# Patient Record
Sex: MALE | Race: BLACK OR AFRICAN AMERICAN | NOT HISPANIC OR LATINO | Employment: PART TIME | URBAN - METROPOLITAN AREA
[De-identification: names, ages, dates, MRNs, and addresses within clinical notes are randomized per-mention and may not be internally consistent; named-entity substitution may affect disease eponyms.]

---

## 2022-05-25 ENCOUNTER — HOSPITAL ENCOUNTER (EMERGENCY)
Facility: HOSPITAL | Age: 42
Discharge: HOME | End: 2022-05-25
Attending: EMERGENCY MEDICINE
Payer: MEDICARE

## 2022-05-25 VITALS
DIASTOLIC BLOOD PRESSURE: 73 MMHG | WEIGHT: 190 LBS | SYSTOLIC BLOOD PRESSURE: 134 MMHG | HEART RATE: 96 BPM | RESPIRATION RATE: 16 BRPM | BODY MASS INDEX: 23.14 KG/M2 | OXYGEN SATURATION: 100 % | HEIGHT: 76 IN | TEMPERATURE: 99 F

## 2022-05-25 DIAGNOSIS — R11.0 NAUSEA: Primary | ICD-10-CM

## 2022-05-25 DIAGNOSIS — R10.9 ABDOMINAL PAIN, UNSPECIFIED ABDOMINAL LOCATION: ICD-10-CM

## 2022-05-25 DIAGNOSIS — E87.6 HYPOKALEMIA: ICD-10-CM

## 2022-05-25 LAB
ALBUMIN SERPL BCP-MCNC: 4.6 G/DL (ref 3.5–5.2)
ALP SERPL-CCNC: 68 U/L (ref 55–135)
ALT SERPL W/O P-5'-P-CCNC: 21 U/L (ref 10–44)
ANION GAP SERPL CALC-SCNC: 10 MMOL/L (ref 8–16)
AST SERPL-CCNC: 22 U/L (ref 10–40)
BACTERIA #/AREA URNS AUTO: ABNORMAL /HPF
BASOPHILS # BLD AUTO: 0.02 K/UL (ref 0–0.2)
BASOPHILS NFR BLD: 0.3 % (ref 0–1.9)
BILIRUB SERPL-MCNC: 0.8 MG/DL (ref 0.1–1)
BILIRUB UR QL STRIP: NEGATIVE
BUN SERPL-MCNC: 17 MG/DL (ref 6–20)
BUN SERPL-MCNC: 20 MG/DL (ref 6–30)
CALCIUM SERPL-MCNC: 10 MG/DL (ref 8.7–10.5)
CHLORIDE SERPL-SCNC: 104 MMOL/L (ref 95–110)
CHLORIDE SERPL-SCNC: 105 MMOL/L (ref 95–110)
CLARITY UR REFRACT.AUTO: CLEAR
CO2 SERPL-SCNC: 24 MMOL/L (ref 23–29)
COLOR UR AUTO: YELLOW
CREAT SERPL-MCNC: 1.5 MG/DL (ref 0.5–1.4)
CREAT SERPL-MCNC: 1.5 MG/DL (ref 0.5–1.4)
DIFFERENTIAL METHOD: ABNORMAL
EOSINOPHIL # BLD AUTO: 0 K/UL (ref 0–0.5)
EOSINOPHIL NFR BLD: 0.4 % (ref 0–8)
ERYTHROCYTE [DISTWIDTH] IN BLOOD BY AUTOMATED COUNT: 12 % (ref 11.5–14.5)
EST. GFR  (AFRICAN AMERICAN): >60 ML/MIN/1.73 M^2
EST. GFR  (NON AFRICAN AMERICAN): 55.8 ML/MIN/1.73 M^2
GLUCOSE SERPL-MCNC: 154 MG/DL (ref 70–110)
GLUCOSE SERPL-MCNC: 159 MG/DL (ref 70–110)
GLUCOSE UR QL STRIP: NEGATIVE
HCT VFR BLD AUTO: 48.1 % (ref 40–54)
HCT VFR BLD CALC: 50 %PCV (ref 36–54)
HGB BLD-MCNC: 17.2 G/DL (ref 14–18)
HGB UR QL STRIP: NEGATIVE
HYALINE CASTS UR QL AUTO: 14 /LPF
IMM GRANULOCYTES # BLD AUTO: 0.02 K/UL (ref 0–0.04)
IMM GRANULOCYTES NFR BLD AUTO: 0.3 % (ref 0–0.5)
KETONES UR QL STRIP: ABNORMAL
LEUKOCYTE ESTERASE UR QL STRIP: NEGATIVE
LIPASE SERPL-CCNC: 17 U/L (ref 4–60)
LYMPHOCYTES # BLD AUTO: 2.2 K/UL (ref 1–4.8)
LYMPHOCYTES NFR BLD: 33 % (ref 18–48)
MCH RBC QN AUTO: 31.3 PG (ref 27–31)
MCHC RBC AUTO-ENTMCNC: 35.8 G/DL (ref 32–36)
MCV RBC AUTO: 88 FL (ref 82–98)
MICROSCOPIC COMMENT: ABNORMAL
MONOCYTES # BLD AUTO: 0.7 K/UL (ref 0.3–1)
MONOCYTES NFR BLD: 9.8 % (ref 4–15)
NEUTROPHILS # BLD AUTO: 3.8 K/UL (ref 1.8–7.7)
NEUTROPHILS NFR BLD: 56.2 % (ref 38–73)
NITRITE UR QL STRIP: NEGATIVE
NRBC BLD-RTO: 0 /100 WBC
PH UR STRIP: 5 [PH] (ref 5–8)
PLATELET # BLD AUTO: 213 K/UL (ref 150–450)
PMV BLD AUTO: 10.5 FL (ref 9.2–12.9)
POC IONIZED CALCIUM: 1.26 MMOL/L (ref 1.06–1.42)
POC TCO2 (MEASURED): 25 MMOL/L (ref 23–29)
POTASSIUM BLD-SCNC: 3.3 MMOL/L (ref 3.5–5.1)
POTASSIUM SERPL-SCNC: 3.3 MMOL/L (ref 3.5–5.1)
PROT SERPL-MCNC: 8.2 G/DL (ref 6–8.4)
PROT UR QL STRIP: ABNORMAL
RBC # BLD AUTO: 5.5 M/UL (ref 4.6–6.2)
RBC #/AREA URNS AUTO: 2 /HPF (ref 0–4)
SAMPLE: ABNORMAL
SODIUM BLD-SCNC: 141 MMOL/L (ref 136–145)
SODIUM SERPL-SCNC: 139 MMOL/L (ref 136–145)
SP GR UR STRIP: >=1.03 (ref 1–1.03)
URN SPEC COLLECT METH UR: ABNORMAL
WBC # BLD AUTO: 6.75 K/UL (ref 3.9–12.7)
WBC #/AREA URNS AUTO: 3 /HPF (ref 0–5)

## 2022-05-25 PROCEDURE — 99284 EMERGENCY DEPT VISIT MOD MDM: CPT | Mod: 25

## 2022-05-25 PROCEDURE — 80047 BASIC METABLC PNL IONIZED CA: CPT

## 2022-05-25 PROCEDURE — 85025 COMPLETE CBC W/AUTO DIFF WBC: CPT | Performed by: EMERGENCY MEDICINE

## 2022-05-25 PROCEDURE — 96374 THER/PROPH/DIAG INJ IV PUSH: CPT

## 2022-05-25 PROCEDURE — 99284 EMERGENCY DEPT VISIT MOD MDM: CPT | Mod: ,,, | Performed by: EMERGENCY MEDICINE

## 2022-05-25 PROCEDURE — 63600175 PHARM REV CODE 636 W HCPCS: Performed by: EMERGENCY MEDICINE

## 2022-05-25 PROCEDURE — 83690 ASSAY OF LIPASE: CPT | Performed by: EMERGENCY MEDICINE

## 2022-05-25 PROCEDURE — 25000003 PHARM REV CODE 250: Performed by: EMERGENCY MEDICINE

## 2022-05-25 PROCEDURE — 81001 URINALYSIS AUTO W/SCOPE: CPT | Performed by: EMERGENCY MEDICINE

## 2022-05-25 PROCEDURE — 80053 COMPREHEN METABOLIC PANEL: CPT | Performed by: EMERGENCY MEDICINE

## 2022-05-25 PROCEDURE — 99284 PR EMERGENCY DEPT VISIT,LEVEL IV: ICD-10-PCS | Mod: ,,, | Performed by: EMERGENCY MEDICINE

## 2022-05-25 RX ORDER — POTASSIUM CHLORIDE 20 MEQ/1
20 TABLET, EXTENDED RELEASE ORAL ONCE
Status: COMPLETED | OUTPATIENT
Start: 2022-05-26 | End: 2022-05-25

## 2022-05-25 RX ORDER — ONDANSETRON 4 MG/1
4 TABLET, FILM COATED ORAL EVERY 6 HOURS PRN
Qty: 12 TABLET | Refills: 0 | Status: SHIPPED | OUTPATIENT
Start: 2022-05-25

## 2022-05-25 RX ORDER — ONDANSETRON 2 MG/ML
4 INJECTION INTRAMUSCULAR; INTRAVENOUS
Status: COMPLETED | OUTPATIENT
Start: 2022-05-25 | End: 2022-05-25

## 2022-05-25 RX ADMIN — POTASSIUM CHLORIDE 20 MEQ: 20 TABLET, EXTENDED RELEASE ORAL at 11:05

## 2022-05-25 RX ADMIN — ONDANSETRON 4 MG: 2 INJECTION INTRAMUSCULAR; INTRAVENOUS at 09:05

## 2022-05-26 ENCOUNTER — HOSPITAL ENCOUNTER (EMERGENCY)
Facility: HOSPITAL | Age: 42
Discharge: HOME | End: 2022-05-27
Attending: EMERGENCY MEDICINE
Payer: MEDICARE

## 2022-05-26 DIAGNOSIS — S62.91XA CLOSED FRACTURE OF RIGHT HAND, INITIAL ENCOUNTER: Primary | ICD-10-CM

## 2022-05-26 PROCEDURE — 99284 EMERGENCY DEPT VISIT MOD MDM: CPT

## 2022-05-26 PROCEDURE — 99284 PR EMERGENCY DEPT VISIT,LEVEL IV: ICD-10-PCS | Mod: ,,, | Performed by: EMERGENCY MEDICINE

## 2022-05-26 PROCEDURE — 63600175 PHARM REV CODE 636 W HCPCS: Performed by: EMERGENCY MEDICINE

## 2022-05-26 PROCEDURE — 96372 THER/PROPH/DIAG INJ SC/IM: CPT | Performed by: EMERGENCY MEDICINE

## 2022-05-26 PROCEDURE — 99284 EMERGENCY DEPT VISIT MOD MDM: CPT | Mod: ,,, | Performed by: EMERGENCY MEDICINE

## 2022-05-26 RX ORDER — KETOROLAC TROMETHAMINE 30 MG/ML
10 INJECTION, SOLUTION INTRAMUSCULAR; INTRAVENOUS
Status: COMPLETED | OUTPATIENT
Start: 2022-05-26 | End: 2022-05-26

## 2022-05-26 RX ADMIN — KETOROLAC TROMETHAMINE 10 MG: 30 INJECTION, SOLUTION INTRAMUSCULAR at 10:05

## 2022-05-26 NOTE — ED NOTES
I-STAT Chem-8+ Results:   Value Reference Range   Sodium 141 136-145 mmol/L   Potassium  3.3 3.5-5.1 mmol/L   Chloride 104  mmol/L   Ionized Calcium 1.26 1.06-1.42 mmol/L   CO2 (measured) 25 23-29 mmol/L   Glucose 159  mg/dL   BUN 20 6-30 mg/dL   Creatinine 1.5 0.5-1.4 mg/dL   Hematocrit 50 36-54%

## 2022-05-26 NOTE — DISCHARGE INSTRUCTIONS
Your labs are reassuring except for slight kidney dysfunction.  Stay hydrated.  Take Zofran as needed for nausea.  Follow-up with primary doctor.

## 2022-05-26 NOTE — ED PROVIDER NOTES
Encounter Date: 5/25/2022    SCRIBE #1 NOTE: I, [place]oke Amado, am scribing for, and in the presence of,  Alicia Umana MD. I have scribed the entire note.   SCRIBE #2 NOTE: I, Makenzie Abel, am scribing for, and in the presence of,  Alicia Umana MD. I have scribed the remaining portions of the note not scribed by Scribe #1.     History     Chief Complaint   Patient presents with    decreased appetite      Pt reports not eating today. Pt denies any pain to mouth. Pt does endorse abdominal pain.      Time patient was seen by the provider: 9:07 PM      The patient is a 42 y.o. male with co-morbidities including: deafness in right ear who presents to the ED with a complaint of decreased appetite with onset of 3 day. He feels nausea, diffuse abdominal pain, subjective fever, and sweats. He denies any vomiting, diarrhea, hematuria, burping, or sick contacts. He stopped drinking alcohol in 2019.     The history is provided by the patient and medical records. No  was used.     Review of patient's allergies indicates:  No Known Allergies  Past Medical History:   Diagnosis Date    Deafness in right ear     uses hearing aid     No past surgical history on file.  No family history on file.  Social History     Tobacco Use    Smoking status: Never Smoker   Substance Use Topics    Alcohol use: Yes    Drug use: No     Review of Systems   Constitutional: Positive for appetite change (decreased ), diaphoresis and fever (subjective ).   HENT: Negative for sore throat.    Respiratory: Negative for shortness of breath.    Cardiovascular: Negative for chest pain.   Gastrointestinal: Positive for abdominal pain and nausea. Negative for diarrhea and vomiting.   Genitourinary: Negative for dysuria and hematuria.   Musculoskeletal: Negative for back pain.   Skin: Negative for rash.   Neurological: Negative for weakness.   Hematological: Does not bruise/bleed easily.       Physical Exam     Initial Vitals [05/25/22  2051]   BP Pulse Resp Temp SpO2   134/73 96 14 98.6 °F (37 °C) 97 %      MAP       --         Physical Exam    Nursing note and vitals reviewed.  Constitutional: He appears well-developed and well-nourished. He is not diaphoretic. No distress.   HENT:   Head: Normocephalic and atraumatic.   Nose: Nose normal.   Eyes: Conjunctivae and EOM are normal. Pupils are equal, round, and reactive to light. No scleral icterus.   Neck: Neck supple.   Normal range of motion.  Cardiovascular: Normal rate and regular rhythm. Exam reveals no gallop and no friction rub.    No murmur heard.  Pulmonary/Chest: Breath sounds normal. No respiratory distress. He has no wheezes. He has no rhonchi. He has no rales.   Abdominal: Abdomen is soft. Bowel sounds are normal. There is abdominal tenderness (periumbilical and left mid). There is no rebound and no guarding.   Musculoskeletal:         General: No tenderness or edema (peripheral). Normal range of motion.      Cervical back: Normal range of motion and neck supple.     Neurological: He is alert and oriented to person, place, and time. He has normal strength. No sensory deficit. GCS score is 15. GCS eye subscore is 4. GCS verbal subscore is 5. GCS motor subscore is 6.   Skin: Skin is warm and dry. No rash noted. No erythema. No pallor.   Psychiatric: He has a normal mood and affect. His behavior is normal. Judgment and thought content normal.         ED Course   Procedures  Labs Reviewed   CBC W/ AUTO DIFFERENTIAL - Abnormal; Notable for the following components:       Result Value    MCH 31.3 (*)     All other components within normal limits   COMPREHENSIVE METABOLIC PANEL - Abnormal; Notable for the following components:    Potassium 3.3 (*)     Glucose 154 (*)     Creatinine 1.5 (*)     eGFR if non  55.8 (*)     All other components within normal limits   URINALYSIS, REFLEX TO URINE CULTURE - Abnormal; Notable for the following components:    Specific Gravity, UA  >=1.030 (*)     Protein, UA 2+ (*)     Ketones, UA Trace (*)     All other components within normal limits    Narrative:     Specimen Source->Urine   URINALYSIS MICROSCOPIC - Abnormal; Notable for the following components:    Hyaline Casts, UA 14 (*)     All other components within normal limits    Narrative:     Specimen Source->Urine   ISTAT PROCEDURE - Abnormal; Notable for the following components:    POC Glucose 159 (*)     POC Creatinine 1.5 (*)     POC Potassium 3.3 (*)     All other components within normal limits   LIPASE          Imaging Results    None          Medications   ondansetron injection 4 mg (4 mg Intravenous Given 5/25/22 2123)   potassium chloride SA CR tablet 20 mEq (20 mEq Oral Given 5/25/22 2332)     Medical Decision Making:   History:   Old Medical Records: I decided to obtain old medical records.  Initial Assessment:   Emergent evaluation of a 42 year old male presents to the ED with nausea and abdominal pain for several days. His vitals are stable and he is well appearing in no distress with mild tenderness on exam with no rebound or guarding.  Differential Diagnosis:   Gastritis, pancreatitis, constipation, gastroenteritis, low suspicion for appendicitis, hernia, or colitis.   Clinical Tests:   Lab Tests: Ordered and Reviewed  ED Management:  - labs  - zofran IV            Scribe Attestation:   Scribe #1: I performed the above scribed service and the documentation accurately describes the services I performed. I attest to the accuracy of the note.  Scribe #2: I performed the above scribed service and the documentation accurately describes the services I performed. I attest to the accuracy of the note.        ED Course as of 05/26/22 1650   Wed May 25, 2022   2200 POC BUN: 20 [GM]   2200 POC Creatinine(!): 1.5 [GM]   2200 POC Potassium(!): 3.3 [GM]   2216 WBC: 6.75 [GM]   2329 Ketones, UA(!): Trace [GM]   2329 Lipase: 17 [GM]   2329 Creatinine today 1.5 up from 1.15 years ago.  He does  have mild hypokalemia at 3.3.  Will replace.  Lipase normal at 17. UA did have trace ketones, 2+ protein but rare bacteria, 3 wbc's.    I have recommended Zofran as needed for nausea.  Return to ED for worsening symptoms.  Meanwhile, increase fluids follow-up with PCP. [GM]      ED Course User Index  [GM] Alicia Umana MD             Clinical Impression:   Final diagnoses:  [R11.0] Nausea (Primary)  [R10.9] Abdominal pain, unspecified abdominal location  [E87.6] Hypokalemia          ED Disposition Condition    Discharge Stable        ED Prescriptions     Medication Sig Dispense Start Date End Date Auth. Provider    ondansetron (ZOFRAN) 4 MG tablet Take 1 tablet (4 mg total) by mouth every 6 (six) hours as needed for Nausea. 12 tablet 5/25/2022  Alicia Umana MD        Follow-up Information     Follow up With Specialties Details Why Contact Info    [facility] - Emergency Dept Emergency Medicine  If symptoms worsen [id number]  [facility] [id number] [phone]           Alicia Umana MD  05/26/22 6434

## 2022-05-27 VITALS
WEIGHT: 190 LBS | OXYGEN SATURATION: 100 % | BODY MASS INDEX: 25.73 KG/M2 | HEIGHT: 72 IN | HEART RATE: 79 BPM | DIASTOLIC BLOOD PRESSURE: 88 MMHG | TEMPERATURE: 98 F | SYSTOLIC BLOOD PRESSURE: 144 MMHG | RESPIRATION RATE: 17 BRPM

## 2022-05-27 PROCEDURE — 29125 APPL SHORT ARM SPLINT STATIC: CPT | Mod: RT

## 2022-05-27 RX ORDER — HYDROCODONE BITARTRATE AND ACETAMINOPHEN 5; 325 MG/1; MG/1
1 TABLET ORAL EVERY 4 HOURS PRN
Qty: 12 TABLET | Refills: 0 | Status: SHIPPED | OUTPATIENT
Start: 2022-05-27

## 2022-05-27 NOTE — DISCHARGE INSTRUCTIONS
If you would like to follow up with the [facility] for further care of your fracture, please call the [facility] Scheduling Department at [phone] during business hours. Please let the  know you need a fracture follow-up appointment with Orthopedics, and you will be scheduled in the Orthopedic Clinic. Please bring your original Emergency Department discharge papers with you to the clinic appointment.

## 2022-05-27 NOTE — ED NOTES
Patient identifiers verified and correct for Olive Pascal  C/C: Right hand injury, pain and swelling   APPEARANCE: awake and alert in NAD.  SKIN: warm, dry and intact. No breakdown or bruising.  MUSCULOSKELETAL: Patient moving all extremities spontaneously, no obvious swelling or deformities noted. Ambulates independently.  RESPIRATORY: Denies shortness of breath.Respirations unlabored.   CARDIAC: Denies CP, 2+ distal pulses; no peripheral edema  ABDOMEN: S/ND/NT, Denies nausea  : voids spontaneously, denies difficulty  Neurologic: AAO x 4; follows commands equal strength in all extremities; denies numbness/tingling. Denies dizziness

## 2022-05-27 NOTE — CONSULTS
Orthopedic Surgery  Consult Note    Olive Pascla  05/27/2022    CC: Right hand injury    HPI: Olive Pascal is a 42 y.o. male with PMHx significant for right ear deafness and chronic hep C who presents with right hand injury after punching a car. Denies prior injuries or surgeries to the right hand. Patient is RHD at baseline. Denies other MSK pains or paresthesias in the right hand. He denies any focal motor deficits or any open injuries.       Past Medical History:   Diagnosis Date    Deafness in right ear     uses hearing aid     No past surgical history on file.  No family history on file.  Social History     Socioeconomic History    Marital status: Single   Tobacco Use    Smoking status: Never Smoker   Substance and Sexual Activity    Alcohol use: Yes    Drug use: No    Sexual activity: Yes     Partners: Male, Female     Birth control/protection: None     No current facility-administered medications on file prior to encounter.     Current Outpatient Medications on File Prior to Encounter   Medication Sig    hyoscyamine (LEVSIN/SL) 0.125 mg Subl Place 1 tablet (0.125 mg total) under the tongue every 4 (four) hours as needed.    naproxen (NAPROSYN) 500 MG tablet Take 1 tablet (500 mg total) by mouth 2 (two) times daily with meals.    ondansetron (ZOFRAN) 4 MG tablet Take 1 tablet (4 mg total) by mouth every 6 (six) hours as needed for Nausea.         Review of Systems:  Constitutional: negative for fevers  Eyes: negative visual changes  ENT: negative for hearing loss  Respiratory: negative for dyspnea  Cardiovascular: negative for chest pain  Gastrointestinal: negative for abdominal pain  Genitourinary: negative for dysuria  Neurological: negative for headaches  Behavioral/Psych: negative for hallucinations  Endocrine: negative for temperature intolerance      Physical Exam:    Temp:  [98.1 °F (36.7 °C)-99.2 °F (37.3 °C)] 98.1 °F (36.7 °C)  Pulse:  [74-84] 79  Resp:  [16-17] 17  SpO2:  [98 %-100 %] 100 %  BP:  (143-152)/() 144/88    Vitals: Afebrile.  Vital signs stable.  General: No acute distress.  HEENT: Normocephalic. Atraumatic. Sclera anicteric. No tracheal deviation.  Cardio: Regular rate.  Chest: No increased work of breathing.  Abdominal: Nondistended.  Extremities: No cyanosis.  No clubbing.    Skin: No generalized rash.  Neuro: Awake. Alert. Oriented to person, place, time, and situation.  Psych: Normal appearance. Cooperative.  Appropriate mood.  Appropriate affect.      MSK:  Right hand exam:    No discoloration  No scars  No wounds  Moderate swelling over dorsum of hand  No masses  Significantly TTP over dorsum of hand at base of 4th/5th metacarpals  No wrist effusion  Warm, well perfused    Fingers flex to distal palmar crease  Thumb opposes to base of small finger  No pain with active wrist flexion/extension    SILT and motor intact M/R/U  Radial pulse palpable        Diagnostic Results: XR of the left hand show a displaced 4th metacarpal base fx and nondisplaced base of 5th metacarpal fx. There is scapholunate widening and dorsal soft tissue swelling.     Assessment/Plan:  Olive Pascal is a 42 y.o. male with right hand fourth and fifth metacarpal base fractures and scapholunate ligament injury.   - Splinted in ED in ulnar gutter splint  - NWB to right hand, pt encouraged to keep extremity iced and elevated at all times  - Patient educated on the signs and symptoms of compartment syndrome and instructed to return to the hospital immediately if these symptoms arise  - Follow up with hand surgery for discussion of further management   - I offered the [place] [facility] but he has care already established with [facility] and wishes to follow up there. All imaging performed today was provided on a disc. I stressed the importance of following up with hand surgery for discussion of possible operative intervention.       Elkin Paniagua MD  Orthopedic Surgery Resident  05/27/2022

## 2022-05-27 NOTE — ED PROVIDER NOTES
Encounter Date: 5/26/2022    SCRIBE #1 NOTE: I, Kenton Amado, am scribing for, and in the presence of,  Alicia Umana MD. I have scribed the following portions of the note - Other sections scribed: HPI ROS PE.   SCRIBE #2 NOTE: I, Gloria Jordon, am scribing for, and in the presence of,  Alicia Umana MD. I have scribed the remaining portions of the note not scribed by Scribe #1.     History     Chief Complaint   Patient presents with    Hand Pain     Right hand pain and swelling after punching a car     Time patient was seen by the provider: 9:47 PM      The patient is a 42 y.o. male with co-morbidities including: deafness in the right ear, who presents to the ED with a complaint of right hand pain onset tonight. The patient's mother is present today and states that he punched a car with his right hand. He now has pain to his wrist and hand and swelling to his 4th and 5th fingers. He took tylenol PTA. Denies elbow pain, chest pain, headache.     The history is provided by the patient, a parent and medical records. No  was used.     Review of patient's allergies indicates:  No Known Allergies  Past Medical History:   Diagnosis Date    Deafness in right ear     uses hearing aid     No past surgical history on file.  No family history on file.  Social History     Tobacco Use    Smoking status: Never Smoker   Substance Use Topics    Alcohol use: Yes    Drug use: No     Review of Systems   Constitutional: Negative for fever.   HENT: Negative for sore throat.    Respiratory: Negative for shortness of breath.    Cardiovascular: Negative for chest pain.   Gastrointestinal: Negative for nausea.   Genitourinary: Negative for dysuria.   Musculoskeletal: Positive for arthralgias (right wrist) and myalgias (right hand). Negative for back pain.        +Right hand swelling   Skin: Negative for rash.   Neurological: Negative for headaches.   Hematological: Does not bruise/bleed easily.       Physical  Exam     Initial Vitals [05/26/22 2047]   BP Pulse Resp Temp SpO2   (!) 152/108 84 16 99.2 °F (37.3 °C) 98 %      MAP       --         Physical Exam    Nursing note and vitals reviewed.  Constitutional: He appears well-developed and well-nourished. He is not diaphoretic. No distress.   HENT:   Head: Normocephalic and atraumatic.   Neck: Neck supple. No JVD present.   Normal range of motion.  Cardiovascular: Normal rate, regular rhythm, normal heart sounds and intact distal pulses.   Pulmonary/Chest: Breath sounds normal. No respiratory distress. He has no wheezes. He has no rhonchi. He has no rales.   Abdominal: Abdomen is soft. He exhibits no distension. There is no abdominal tenderness.   Musculoskeletal:         General: No tenderness or edema. Normal range of motion.      Right wrist: No swelling.      Cervical back: Normal range of motion and neck supple.      Comments: Significant swelling and tenderness over the 4th and 5th metacarpal. Full range of motion of the fingers. No significant swelling to the wrist.      Neurological: He is alert and oriented to person, place, and time. He has normal strength. No cranial nerve deficit or sensory deficit.   Skin: Skin is warm and dry.         ED Course   Procedures  Labs Reviewed - No data to display       Imaging Results          X-Ray Hand 3 view Right (Final result)  Result time 05/26/22 22:58:39    Final result by Isidro Garcia MD (05/26/22 22:58:39)                 Impression:      Acute fractures involving the 4th and 5th metacarpals with additional questionable fracture of the hamate.    Small bone fragment adjacent to the radial styloid which could represent a bone spur versus accessory ossicle.  Avulsion fracture felt less likely in this patient with punching injury though correlation is advised.    Additional findings discussed in the body of the report.      Electronically signed by: Isidro Garcia MD  Date:    05/26/2022  Time:    22:58              Narrative:    EXAMINATION:  XR HAND COMPLETE 3 VIEW RIGHT    CLINICAL HISTORY:  right hand pain and swelling;    TECHNIQUE:  Three views of the right hand.    COMPARISON:  None.    FINDINGS:  Displaced acute fracture involving the base of the 4th metacarpal with approximately 4-5 mm fracture displacement.  Minimal nondisplaced acute fracture involving the base of the 5th metacarpal at the carpometacarpal articulation.  Small fracture fragment noted inferior to the 5th metacarpal with donor site potentially the hamate or 5th metacarpal.  Small spur or avulsion injury involving the radial styloid.  Relative dorsal positioning of the distal ulna with respect to the distal radius could be exaggerated by positioning though suggest correlation for any clinical signs of distal radioulnar joint injury.  There is soft tissue edema along the dorsal aspect of the hand.  No unexpected radiopaque foreign body.                                 Medications   ketorolac injection 9.999 mg (9.999 mg Intramuscular Given 5/26/22 2207)     Medical Decision Making:   History:   Old Medical Records: I decided to obtain old medical records.  Initial Assessment:   Emergent evaluation a 42-year-old male presenting with right hand pain and swelling after punching a car.    Hypertensive otherwise vital signs are stable  Differential Diagnosis:   Fracture, contusion, sprain, strain  Independently Interpreted Test(s):   I have ordered and independently interpreted X-rays - see prior notes.  Clinical Tests:   Radiological Study: Ordered and Reviewed  ED Management:  - x-ray  - Toradol IM          Scribe Attestation:   Scribe #1: I performed the above scribed service and the documentation accurately describes the services I performed. I attest to the accuracy of the note.  Scribe #2: I performed the above scribed service and the documentation accurately describes the services I performed. I attest to the accuracy of the note.        ED Course as of  05/27/22 0037   Thu May 26, 2022   2255 X-ray reviewed, concerning for displaced, 4th metacarpal fracture.    Will likely need to be reduced, orthopedics consulted. [GM]   2323 Discussed with Ortho, will evaluate patient [GM]   Fri May 27, 2022   0030 Patient evaluate Ortho at bedside, placed in splint.    Repeat x-rays performed.    Patient given ambulatory referral to follow up at [facility].  Patient provided with CD w/ his imaging.  Norco for pain control.  Sling for comfort.  Return precautions given   [GM]      ED Course User Index  [GM] Alicia Umana MD             Clinical Impression:   Final diagnoses:  [S62.91XA] Closed fracture of right hand, initial encounter (Primary)          ED Disposition Condition    Discharge Stable        ED Prescriptions     None        Follow-up Information     Follow up With Specialties Details Why Contact [facility] Surgical Oncology, Orthopedic Surgery, Genetics, Physical Medicine and Rehabilitation, Occupational Therapy, Radiology Schedule an appointment as soon as possible for a visit   [address] [place] [id number]  [phone]             Alicia Umana MD  05/27/22 0037

## 2023-01-09 ENCOUNTER — HOSPITAL ENCOUNTER (EMERGENCY)
Facility: HOSPITAL | Age: 43
Discharge: HOME | End: 2023-01-09
Attending: EMERGENCY MEDICINE
Payer: MEDICARE

## 2023-01-09 VITALS
RESPIRATION RATE: 16 BRPM | OXYGEN SATURATION: 98 % | WEIGHT: 190 LBS | DIASTOLIC BLOOD PRESSURE: 90 MMHG | TEMPERATURE: 99 F | SYSTOLIC BLOOD PRESSURE: 158 MMHG | HEART RATE: 67 BPM | HEIGHT: 72 IN | BODY MASS INDEX: 25.73 KG/M2

## 2023-01-09 DIAGNOSIS — K76.9 LIVER LESION: ICD-10-CM

## 2023-01-09 DIAGNOSIS — N12 PYELONEPHRITIS: Primary | ICD-10-CM

## 2023-01-09 LAB
ALBUMIN SERPL BCP-MCNC: 4.4 G/DL (ref 3.5–5.2)
ALP SERPL-CCNC: 63 U/L (ref 55–135)
ALT SERPL W/O P-5'-P-CCNC: 24 U/L (ref 10–44)
ANION GAP SERPL CALC-SCNC: 7 MMOL/L (ref 8–16)
AST SERPL-CCNC: 15 U/L (ref 10–40)
BACTERIA #/AREA URNS AUTO: ABNORMAL /HPF
BASOPHILS # BLD AUTO: 0.02 K/UL (ref 0–0.2)
BASOPHILS NFR BLD: 0.3 % (ref 0–1.9)
BILIRUB SERPL-MCNC: 0.6 MG/DL (ref 0.1–1)
BILIRUB UR QL STRIP: NEGATIVE
BUN SERPL-MCNC: 8 MG/DL (ref 6–20)
BUN SERPL-MCNC: 8 MG/DL (ref 6–30)
CALCIUM SERPL-MCNC: 10.2 MG/DL (ref 8.7–10.5)
CHLORIDE SERPL-SCNC: 105 MMOL/L (ref 95–110)
CHLORIDE SERPL-SCNC: 106 MMOL/L (ref 95–110)
CLARITY UR REFRACT.AUTO: CLEAR
CO2 SERPL-SCNC: 23 MMOL/L (ref 23–29)
COLOR UR AUTO: YELLOW
CREAT SERPL-MCNC: 0.9 MG/DL (ref 0.5–1.4)
CREAT SERPL-MCNC: 1.2 MG/DL (ref 0.5–1.4)
DIFFERENTIAL METHOD: NORMAL
EOSINOPHIL # BLD AUTO: 0.1 K/UL (ref 0–0.5)
EOSINOPHIL NFR BLD: 1.5 % (ref 0–8)
ERYTHROCYTE [DISTWIDTH] IN BLOOD BY AUTOMATED COUNT: 12.1 % (ref 11.5–14.5)
EST. GFR  (NO RACE VARIABLE): >60 ML/MIN/1.73 M^2
GLUCOSE SERPL-MCNC: 103 MG/DL (ref 70–110)
GLUCOSE SERPL-MCNC: 83 MG/DL (ref 70–110)
GLUCOSE UR QL STRIP: NEGATIVE
HCT VFR BLD AUTO: 48.7 % (ref 40–54)
HCT VFR BLD CALC: 48 %PCV (ref 36–54)
HCV AB SERPL QL IA: REACTIVE
HGB BLD-MCNC: 17 G/DL (ref 14–18)
HGB UR QL STRIP: NEGATIVE
HIV 1+2 AB+HIV1 P24 AG SERPL QL IA: NORMAL
IMM GRANULOCYTES # BLD AUTO: 0.01 K/UL (ref 0–0.04)
IMM GRANULOCYTES NFR BLD AUTO: 0.2 % (ref 0–0.5)
KETONES UR QL STRIP: NEGATIVE
LEUKOCYTE ESTERASE UR QL STRIP: ABNORMAL
LIPASE SERPL-CCNC: 15 U/L (ref 4–60)
LYMPHOCYTES # BLD AUTO: 1.9 K/UL (ref 1–4.8)
LYMPHOCYTES NFR BLD: 32 % (ref 18–48)
MCH RBC QN AUTO: 31 PG (ref 27–31)
MCHC RBC AUTO-ENTMCNC: 34.9 G/DL (ref 32–36)
MCV RBC AUTO: 89 FL (ref 82–98)
MICROSCOPIC COMMENT: ABNORMAL
MONOCYTES # BLD AUTO: 0.6 K/UL (ref 0.3–1)
MONOCYTES NFR BLD: 9.6 % (ref 4–15)
NEUTROPHILS # BLD AUTO: 3.4 K/UL (ref 1.8–7.7)
NEUTROPHILS NFR BLD: 56.4 % (ref 38–73)
NITRITE UR QL STRIP: NEGATIVE
NRBC BLD-RTO: 0 /100 WBC
PH UR STRIP: 6 [PH] (ref 5–8)
PLATELET # BLD AUTO: 177 K/UL (ref 150–450)
PMV BLD AUTO: 10.3 FL (ref 9.2–12.9)
POC IONIZED CALCIUM: 1.17 MMOL/L (ref 1.06–1.42)
POC TCO2 (MEASURED): 28 MMOL/L (ref 23–29)
POTASSIUM BLD-SCNC: 4.6 MMOL/L (ref 3.5–5.1)
POTASSIUM SERPL-SCNC: 3.5 MMOL/L (ref 3.5–5.1)
PROT SERPL-MCNC: 7.9 G/DL (ref 6–8.4)
PROT UR QL STRIP: NEGATIVE
RBC # BLD AUTO: 5.49 M/UL (ref 4.6–6.2)
RBC #/AREA URNS AUTO: 2 /HPF (ref 0–4)
SAMPLE: NORMAL
SODIUM BLD-SCNC: 141 MMOL/L (ref 136–145)
SODIUM SERPL-SCNC: 136 MMOL/L (ref 136–145)
SP GR UR STRIP: 1.02 (ref 1–1.03)
SQUAMOUS #/AREA URNS AUTO: 0 /HPF
URN SPEC COLLECT METH UR: ABNORMAL
WBC # BLD AUTO: 6.07 K/UL (ref 3.9–12.7)
WBC #/AREA URNS AUTO: 20 /HPF (ref 0–5)

## 2023-01-09 PROCEDURE — 87522 HEPATITIS C REVRS TRNSCRPJ: CPT | Mod: 91 | Performed by: PHYSICIAN ASSISTANT

## 2023-01-09 PROCEDURE — 86803 HEPATITIS C AB TEST: CPT | Performed by: PHYSICIAN ASSISTANT

## 2023-01-09 PROCEDURE — 99285 EMERGENCY DEPT VISIT HI MDM: CPT | Mod: 25

## 2023-01-09 PROCEDURE — 96365 THER/PROPH/DIAG IV INF INIT: CPT | Mod: 59

## 2023-01-09 PROCEDURE — 83690 ASSAY OF LIPASE: CPT | Performed by: PHYSICIAN ASSISTANT

## 2023-01-09 PROCEDURE — 99284 EMERGENCY DEPT VISIT MOD MDM: CPT | Mod: ,,, | Performed by: PHYSICIAN ASSISTANT

## 2023-01-09 PROCEDURE — 99284 PR EMERGENCY DEPT VISIT,LEVEL IV: ICD-10-PCS | Mod: ,,, | Performed by: PHYSICIAN ASSISTANT

## 2023-01-09 PROCEDURE — 87389 HIV-1 AG W/HIV-1&-2 AB AG IA: CPT | Performed by: PHYSICIAN ASSISTANT

## 2023-01-09 PROCEDURE — 80047 BASIC METABLC PNL IONIZED CA: CPT | Mod: 59

## 2023-01-09 PROCEDURE — 81001 URINALYSIS AUTO W/SCOPE: CPT | Performed by: PHYSICIAN ASSISTANT

## 2023-01-09 PROCEDURE — 96375 TX/PRO/DX INJ NEW DRUG ADDON: CPT

## 2023-01-09 PROCEDURE — 96361 HYDRATE IV INFUSION ADD-ON: CPT

## 2023-01-09 PROCEDURE — 80053 COMPREHEN METABOLIC PANEL: CPT | Performed by: PHYSICIAN ASSISTANT

## 2023-01-09 PROCEDURE — 25500020 PHARM REV CODE 255: Performed by: EMERGENCY MEDICINE

## 2023-01-09 PROCEDURE — 85025 COMPLETE CBC W/AUTO DIFF WBC: CPT | Performed by: PHYSICIAN ASSISTANT

## 2023-01-09 PROCEDURE — 25000003 PHARM REV CODE 250: Performed by: PHYSICIAN ASSISTANT

## 2023-01-09 PROCEDURE — 87522 HEPATITIS C REVRS TRNSCRPJ: CPT | Performed by: PHYSICIAN ASSISTANT

## 2023-01-09 PROCEDURE — 63600175 PHARM REV CODE 636 W HCPCS: Performed by: PHYSICIAN ASSISTANT

## 2023-01-09 PROCEDURE — 87086 URINE CULTURE/COLONY COUNT: CPT | Performed by: PHYSICIAN ASSISTANT

## 2023-01-09 RX ORDER — KETOROLAC TROMETHAMINE 30 MG/ML
10 INJECTION, SOLUTION INTRAMUSCULAR; INTRAVENOUS
Status: COMPLETED | OUTPATIENT
Start: 2023-01-09 | End: 2023-01-09

## 2023-01-09 RX ORDER — CEFDINIR 300 MG/1
300 CAPSULE ORAL 2 TIMES DAILY
Qty: 20 CAPSULE | Refills: 0 | Status: SHIPPED | OUTPATIENT
Start: 2023-01-09 | End: 2023-01-19

## 2023-01-09 RX ADMIN — CEFTRIAXONE 1 G: 1 INJECTION, POWDER, FOR SOLUTION INTRAMUSCULAR; INTRAVENOUS at 04:01

## 2023-01-09 RX ADMIN — IOHEXOL 100 ML: 350 INJECTION, SOLUTION INTRAVENOUS at 03:01

## 2023-01-09 RX ADMIN — SODIUM CHLORIDE 1000 ML: 9 INJECTION, SOLUTION INTRAVENOUS at 12:01

## 2023-01-09 RX ADMIN — KETOROLAC TROMETHAMINE 10 MG: 30 INJECTION, SOLUTION INTRAMUSCULAR; INTRAVENOUS at 12:01

## 2023-01-09 NOTE — ED NOTES
I-STAT Chem-8+ Results:   Value Reference Range   Sodium 141 136-145 mmol/L   Potassium  4.6 3.5-5.1 mmol/L   Chloride 105  mmol/L   Ionized Calcium 1.17 1.06-1.42 mmol/L   CO2 (measured) 28 23-29 mmol/L   Glucose 83  mg/dL   BUN 8 6-30 mg/dL   Creatinine 0.9 0.5-1.4 mg/dL   Hematocrit 48 36-54%

## 2023-01-09 NOTE — DISCHARGE INSTRUCTIONS
You have a kidney infection.  You were given dose of antibiotics in the ER. Continue vantin every 12 days for the next 10 days to complete treatment.   You can take acetaminophen/tylenol 650 mg every 6 hours or 1000 mg every 8 hours for added relief.  Apply ice to the area for 10-20 minutes every 4 hours. You can apply heat 2 days after for the same duration and frequency.  Follow up with PCP.  Return to the ER for new or worsening symptoms.  Imaging Results              CT Abdomen Pelvis With Contrast (Final result)  Result time 01/09/23 16:09:09      Final result by Manjit Adams MD (01/09/23 16:09:09)                   Impression:      Apparent hyperemia of the distal stomach/proximal duodenum.  Recommend clinical correlation with history of gastritis/duodenitis.    No evidence of appendicitis, cholecystitis, or diverticulitis.    Indeterminate 1.7 cm hypoattenuating lesion within the right hepatic lobe, which demonstrates asymmetric peripheral enhancement, and possibly represents hemangioma.  Recommend outpatient follow-up with CT triple phase or MRI, if clinically warranted.    Few additional hypoattenuating liver lesions, which are too small to characterize.    Stable right-sided septated renal cysts.    Ptotic, malrotated left kidney.    Electronically signed by resident: Asuncion Alejanrdo  Date:    01/09/2023  Time:    15:18    Electronically signed by: Manjit Adams MD  Date:    01/09/2023  Time:    16:09               Narrative:    EXAMINATION:  CT ABDOMEN PELVIS WITH CONTRAST    CLINICAL HISTORY:  Abdominal pain, acute, nonlocalized; generalized abdominal pain, right flank pain, bilateral low back pain that began 1 week ago.    TECHNIQUE:  Axial images of the chest, abdomen, and pelvis were acquired after the use of 100 cc [id number] IV contrast.  Coronal and sagittal reconstructions were also obtained    COMPARISON:  Abdominal ultrasound 04/26/2016    FINDINGS:  Thoracic soft tissues: Partially visualized thoracic  soft tissues appear unremarkable.    Heart: No pericardial effusion.    Lungs: Lung bases are clear.  No pleural effusion.    Esophagus: Unremarkable.    Stomach and duodenum: Possible mild hyperemia of the distal gastric and proximal duodenal mucosa.    Liver: Normal in size and contour.  Within the right hepatic lobe there is a hypoattenuating lesion with some peripheral nodular enhancement, which measures 1.7 x 1.1 cm (series 2, image 42).  Few additional small hypoattenuating liver lesions, which are too small to characterize (for example series 2, image 20 and series 2, image 27).    Gallbladder: No calcified gallstones.    Bile Ducts: No evidence of dilated ducts.    Spleen: Unremarkable.    Pancreas: No mass or peripancreatic fat stranding.    Adrenals: Unremarkable.    Kidneys/Ureters: The right kidney is normal in size and location.  There is a septated right renal cyst measuring up to 1.5 cm in greatest length, similar to ultrasound dated 04/26/2016..  The left kidney is ptotic and somewhat malrotated, as detailed in prior ultrasound report.  As best appreciated on the sagittal view, the left renal artery arises from the anterior aspect of the abdominal aorta at the L3-4 level.  Symmetric bilateral renal enhancement.  No hydronephrosis or nephrolithiasis. No ureteral dilatation.    Bladder: No evidence of wall thickening.    Reproductive organs: Unremarkable.    Bowel/Mesentery: Small bowel is normal in caliber with no evidence of obstruction.  No evidence of inflammation or wall thickening.  Colon demonstrates no focal wall thickening.  Appendix is visualized and appears within normal limits.    Peritoneum: No intraperitoneal free air or fluid.    Lymph nodes: No lymphadenopathy.    Abdominal wall:  Unremarkable.    Vasculature: No aneurysm. No significant calcific atherosclerosis.    Bones: Few sclerotic foci in the pelvic bones, likely bone islands.  No acute fracture. No suspicious osseous lesions.

## 2023-01-09 NOTE — PROVIDER PROGRESS NOTES - EMERGENCY DEPT.
Encounter Date: 1/9/2023    ED Physician Progress Notes        Physician Note:   ED Physician Hand-off Note:    ED Course: I assumed care of patient from off-going ED primary team. Briefly, patient is a 42-year-old male with a history of hepatitis-C, successfully treated per patient, deaf, that presents to [facility] ED with right flank pain and back pain for 1 week.  CBC with no leukocytosis or anemia.  CMP without electrolyte abnormalities, kidney injury, or signs of liver failure.  No signs of pancreatitis.  UA with microscopic hematuria and rare bacteria.  Patient given ceftriaxone 1 g IV.    At the time of sign-out, workup was pending CT scan and clinical improvement.    Medications given in the ED:    Medications  sodium chloride 0.9% bolus 1,000 mL 1,000 mL (0 mLs Intravenous Stopped 1/9/23 1324)  ketorolac injection 9.999 mg (9.999 mg Intravenous Given 1/9/23 1229)  iohexoL (OMNIPAQUE 350) injection 100 mL (100 mLs Intravenous Given 1/9/23 1512)  cefTRIAXone (ROCEPHIN) 1 g in dextrose 5 % in water (D5W) 5 % 50 mL IVPB (MB+) (1 g Intravenous New Bag 1/9/23 1603)    ED course:  4:54 PM  [employer] interpretation services used.   Patient received ceftriaxone IV for UTI.  His CT scan shows possible gastritis/duodenitis.  No appendicitis, cholecystitis, diverticulitis, nephrolithiasis, or hydronephrosis.  He has liver lesions.  Patient updated with results.  We will continue treatment for pyelonephritis given flank pain with Vantin.  Discussed signs of gastritis.  Recommend OTC Pepcid.  I also notified him of liver lesions for which he should follow-up with PCP.  Was provided with printout of his CT scan.  All of his questions were answered.  Patient comfortable with plan and stable for discharge.    Disposition: discharge    Impression:   Pyelonephritis  (primary encounter diagnosis)  Liver lesion        4:55 PM  Lamar Canseco PA-C  Emergent Department  [facility]  Spectralink #[id number] or #[phone]

## 2023-01-09 NOTE — Clinical Note
"Olive Pascal (Jabbaar) was seen and treated in our emergency department on 1/9/2023.  He may return to work on 01/10/2023.       If you have any questions or concerns, please don't hesitate to call.      Lamar Canseco PA-C"

## 2023-01-09 NOTE — ED TRIAGE NOTES
"Olive Pascal, a 42 y.o. male presents to the ED w/ complaint of back pain, abdominal pain and side pain "allover. Pt reports the back pain as a grabbing sensation, cad cramping pain in abdomen. Pt reports nothing makes the pain better or worse- constant pain.  Pt reports last bowel movement was a week ago and it was black stool.    Triage note:  Chief Complaint   Patient presents with    Multiple complaints     Lower back pain and lower abd pain for week,     Review of patient's allergies indicates:  No Known Allergies  Past Medical History:   Diagnosis Date    Deafness in right ear     uses hearing aid       "

## 2023-01-09 NOTE — ED PROVIDER NOTES
Encounter Date: 1/9/2023       History     Chief Complaint   Patient presents with    Multiple complaints     Lower back pain and lower abd pain for week,     42-year-old male with past medical history of deafness in the right ear presents to the ED with chief complaint of generalized abdominal pain, right flank pain, bilateral low back pain that began 1 week ago.  His symptoms have been constant since onset.  He endorses similar symptoms in the past.  Thinks it was related to his hepatitis-C.  He states that he was fully treated several years ago.  He had 1 episode of dark brown stool a week ago.  He has  had two bowel movements since then, which were normal in color.  His last bowel movement was yesterday and was normal for him. He denies fevers, chills, chest pain, shortness of breath, cough, congestion, dysuria, hematuria.  He had a positive home COVID test 4 days ago.  His father and mother also test positive. He has not taken any medications for his symptoms. He denies other worsening or alleviating factors.     Review of patient's allergies indicates:  No Known Allergies  Past Medical History:   Diagnosis Date    Deafness in right ear     uses hearing aid     History reviewed. No pertinent surgical history.  History reviewed. No pertinent family history.  Social History     Tobacco Use    Smoking status: Never   Substance Use Topics    Alcohol use: Yes    Drug use: No     Review of Systems   Constitutional:  Negative for fever.   HENT:  Negative for sore throat.    Respiratory:  Negative for shortness of breath.    Cardiovascular:  Negative for chest pain.   Gastrointestinal:  Positive for abdominal pain and constipation. Negative for nausea.   Genitourinary:  Positive for flank pain. Negative for dysuria.   Musculoskeletal:  Positive for back pain.   Skin:  Negative for rash.   Neurological:  Negative for weakness.   Hematological:  Does not bruise/bleed easily.     Physical Exam     Initial Vitals [01/09/23  1018]   BP Pulse Resp Temp SpO2   (!) 154/101 66 18 98.6 °F (37 °C) 100 %      MAP       --         Physical Exam    Nursing note and vitals reviewed.  Constitutional: He appears well-developed and well-nourished. He is not diaphoretic. No distress.   HENT:   Head: Normocephalic and atraumatic.   Mouth/Throat: Oropharynx is clear and moist.   Eyes: EOM are normal. Pupils are equal, round, and reactive to light.   Neck: Neck supple.   Normal range of motion.  Cardiovascular:  Normal rate, regular rhythm, normal heart sounds and intact distal pulses.     Exam reveals no gallop and no friction rub.       No murmur heard.  Pulmonary/Chest: Breath sounds normal. He has no wheezes. He has no rhonchi. He has no rales.   Abdominal: Abdomen is soft. Bowel sounds are normal. There is generalized abdominal tenderness.   Genitourinary:    Genitourinary Comments: Defers      Musculoskeletal:         General: Normal range of motion.      Cervical back: Normal range of motion and neck supple.     Neurological: He is alert and oriented to person, place, and time. He has normal strength. GCS score is 15. GCS eye subscore is 4. GCS verbal subscore is 5. GCS motor subscore is 6.   Skin: Skin is warm and dry. Capillary refill takes less than 2 seconds.   Psychiatric: He has a normal mood and affect. His behavior is normal. Judgment and thought content normal.       ED Course   Procedures  Labs Reviewed   HEPATITIS C ANTIBODY - Abnormal; Notable for the following components:       Result Value    Hepatitis C Ab Reactive (*)     All other components within normal limits    Narrative:     Release to patient->Immediate   COMPREHENSIVE METABOLIC PANEL - Abnormal; Notable for the following components:    Anion Gap 7 (*)     All other components within normal limits   URINALYSIS, REFLEX TO URINE CULTURE - Abnormal; Notable for the following components:    Leukocytes, UA 2+ (*)     All other components within normal limits    Narrative:      Specimen Source->Urine   URINALYSIS MICROSCOPIC - Abnormal; Notable for the following components:    WBC, UA 20 (*)     All other components within normal limits    Narrative:     Specimen Source->Urine   CULTURE, URINE   HIV 1 / 2 ANTIBODY    Narrative:     Release to patient->Immediate   CBC W/ AUTO DIFFERENTIAL   LIPASE   HEPATITIS C RNA, QUANTITATIVE, PCR   ISTAT PROCEDURE          Imaging Results              CT Abdomen Pelvis With Contrast (Final result)  Result time 01/09/23 16:09:09      Final result by Manjit Adams MD (01/09/23 16:09:09)                   Impression:      Apparent hyperemia of the distal stomach/proximal duodenum.  Recommend clinical correlation with history of gastritis/duodenitis.    No evidence of appendicitis, cholecystitis, or diverticulitis.    Indeterminate 1.7 cm hypoattenuating lesion within the right hepatic lobe, which demonstrates asymmetric peripheral enhancement, and possibly represents hemangioma.  Recommend outpatient follow-up with CT triple phase or MRI, if clinically warranted.    Few additional hypoattenuating liver lesions, which are too small to characterize.    Stable right-sided septated renal cysts.    Ptotic, malrotated left kidney.    Electronically signed by resident: Asuncion Alejandro  Date:    01/09/2023  Time:    15:18    Electronically signed by: Manjit Adams MD  Date:    01/09/2023  Time:    16:09               Narrative:    EXAMINATION:  CT ABDOMEN PELVIS WITH CONTRAST    CLINICAL HISTORY:  Abdominal pain, acute, nonlocalized; generalized abdominal pain, right flank pain, bilateral low back pain that began 1 week ago.    TECHNIQUE:  Axial images of the chest, abdomen, and pelvis were acquired after the use of 100 cc [id number] IV contrast.  Coronal and sagittal reconstructions were also obtained    COMPARISON:  Abdominal ultrasound 04/26/2016    FINDINGS:  Thoracic soft tissues: Partially visualized thoracic soft tissues appear unremarkable.    Heart: No pericardial  effusion.    Lungs: Lung bases are clear.  No pleural effusion.    Esophagus: Unremarkable.    Stomach and duodenum: Possible mild hyperemia of the distal gastric and proximal duodenal mucosa.    Liver: Normal in size and contour.  Within the right hepatic lobe there is a hypoattenuating lesion with some peripheral nodular enhancement, which measures 1.7 x 1.1 cm (series 2, image 42).  Few additional small hypoattenuating liver lesions, which are too small to characterize (for example series 2, image 20 and series 2, image 27).    Gallbladder: No calcified gallstones.    Bile Ducts: No evidence of dilated ducts.    Spleen: Unremarkable.    Pancreas: No mass or peripancreatic fat stranding.    Adrenals: Unremarkable.    Kidneys/Ureters: The right kidney is normal in size and location.  There is a septated right renal cyst measuring up to 1.5 cm in greatest length, similar to ultrasound dated 04/26/2016..  The left kidney is ptotic and somewhat malrotated, as detailed in prior ultrasound report.  As best appreciated on the sagittal view, the left renal artery arises from the anterior aspect of the abdominal aorta at the L3-4 level.  Symmetric bilateral renal enhancement.  No hydronephrosis or nephrolithiasis. No ureteral dilatation.    Bladder: No evidence of wall thickening.    Reproductive organs: Unremarkable.    Bowel/Mesentery: Small bowel is normal in caliber with no evidence of obstruction.  No evidence of inflammation or wall thickening.  Colon demonstrates no focal wall thickening.  Appendix is visualized and appears within normal limits.    Peritoneum: No intraperitoneal free air or fluid.    Lymph nodes: No lymphadenopathy.    Abdominal wall:  Unremarkable.    Vasculature: No aneurysm. No significant calcific atherosclerosis.    Bones: Few sclerotic foci in the pelvic bones, likely bone islands.  No acute fracture. No suspicious osseous lesions.                                       Medications   sodium  chloride 0.9% bolus 1,000 mL 1,000 mL (0 mLs Intravenous Stopped 1/9/23 1324)   ketorolac injection 9.999 mg (9.999 mg Intravenous Given 1/9/23 1229)   iohexoL (OMNIPAQUE 350) injection 100 mL (100 mLs Intravenous Given 1/9/23 1512)   cefTRIAXone (ROCEPHIN) 1 g in dextrose 5 % in water (D5W) 5 % 50 mL IVPB (MB+) (0 g Intravenous Stopped 1/9/23 1630)     Medical Decision Making:   History:   Old Medical Records: I decided to obtain old medical records.  Initial Assessment:   Emergent evaluation of a 42 y.o. male presenting to the emergency department complaining of abdominal pain, low back pain, right sided flank pain x 1 week. Patient is afebrile, hemodynamically stable, and non toxic appearing.   Will order labs, imaging, analgesia.   Differential Diagnosis:   Ddx includes but is not limited to gastritis, pancreatitis, uti, ureterolithiasis.   Clinical Tests:   Lab Tests: Ordered and Reviewed  Radiological Study: Ordered and Reviewed  ED Management:  UA with 2+, 20 wbc's, rare bacteria. No urinary symptoms but this is concerning for infection in an otherwise healthy male. Will treat for pyelonephritis with rocephin and omnicef.    No severe metabolic or hematologic derangements.  Lipase 15.  CT abdomen pelvis pending.  Due to shift change, will sign out to oncoming provider who will continue to monitor until final disposition reached.  Anticipate discharge if no emergent findings on CT. The patient's history, physical exam, and plan of care was discussed with and agreed upon with my supervising physician.               Attending Attestation:     Physician Attestation Statement for NP/PA:   I discussed this assessment and plan of this patient with the NP/PA, but I did not personally examine the patient. The face to face encounter was performed by the NP/PA.            ED Course as of 01/10/23 0957   Mon Jan 09, 2023   1323 WBC: 6.07 [JM]   1323 Hemoglobin: 17.0 [JM]   1323 Hematocrit: 48.7 [JM]   1323 Platelets: 177  [JM]      ED Course User Index  [JM] Cynthia Casanova PA-C                 Clinical Impression:   Final diagnoses:  [N12] Pyelonephritis (Primary)  [K76.9] Liver lesion        ED Disposition Condition    Discharge Stable          ED Prescriptions       Medication Sig Dispense Start Date End Date Auth. Provider    cefdinir (OMNICEF) 300 MG capsule Take 1 capsule (300 mg total) by mouth 2 (two) times daily. for 10 days 20 capsule 1/9/2023 1/19/2023 Cynthia Casanova PA-C          Follow-up Information       Follow up With Specialties Details Why Contact Info    [facility]  Schedule an appointment as soon as possible for a visit  [phone] [address] [place] [id number]    [facility]  Schedule an appointment as soon as possible for a visit  [phone] [address] [facility] [address]    [facility] - Emergency Dept Emergency Medicine  If symptoms worsen [address] [facility] [address] [phone]             Cynthia Casanova PA-C  01/09/23 1608       Quique Ferraro MD  01/10/23 0941

## 2023-01-09 NOTE — ED NOTES
LOC: The patient is awake, alert, and oriented to self, place, time, and situation. Pt is calm and cooperative. Affect is appropriate.  Speech is appropriate and clear.     APPEARANCE: Patient resting comfortably in no acute distress.  Patient is clean and well groomed.    SKIN: The skin is warm and dry; color consistent with ethnicity.  Patient has normal skin turgor and moist mucus membranes.  Skin intact; no breakdown or bruising noted.     MUSCULOSKELETAL: Pt reports constant back pain unrelieved with rest.     RESPIRATORY: Airway is open and patent. Respirations spontaneous, even, easy, and non-labored.  Patient has a normal effort and rate.  No accessory muscle use noted. Denies cough.     CARDIAC:  Normal rate noted.  No peripheral edema noted. No complaints of chest pain.      ABDOMEN: Pt reports abdominal pain all over for approx a week. Last BM was one week ago and was black stool    NEUROLOGIC: Eyes open spontaneously.  Behavior appropriate to situation.  Follows commands; facial expression symmetrical.  Purposeful motor response noted; normal sensation in all extremities. Pt denies headache; denies lightheadedness or dizziness; denies visual disturbances; denies loss of balance; denies unilateral weakness.

## 2023-01-10 LAB — BACTERIA UR CULT: NORMAL

## 2023-01-11 LAB — HCV RNA SERPL NAA+PROBE-ACNC: NORMAL IU/ML

## 2023-06-15 ENCOUNTER — HOSPITAL ENCOUNTER (EMERGENCY)
Facility: HOSPITAL | Age: 43
Discharge: HOME | End: 2023-06-15
Attending: EMERGENCY MEDICINE
Payer: MEDICARE

## 2023-06-15 VITALS
BODY MASS INDEX: 25.06 KG/M2 | HEART RATE: 61 BPM | HEIGHT: 72 IN | RESPIRATION RATE: 18 BRPM | DIASTOLIC BLOOD PRESSURE: 92 MMHG | TEMPERATURE: 98 F | SYSTOLIC BLOOD PRESSURE: 150 MMHG | OXYGEN SATURATION: 99 % | WEIGHT: 185 LBS

## 2023-06-15 DIAGNOSIS — R10.9 ABDOMINAL PAIN, UNSPECIFIED ABDOMINAL LOCATION: Primary | ICD-10-CM

## 2023-06-15 LAB
ALBUMIN SERPL BCP-MCNC: 4 G/DL (ref 3.5–5.2)
ALP SERPL-CCNC: 65 U/L (ref 55–135)
ALT SERPL W/O P-5'-P-CCNC: 33 U/L (ref 10–44)
ANION GAP SERPL CALC-SCNC: 10 MMOL/L (ref 8–16)
AST SERPL-CCNC: 20 U/L (ref 10–40)
BASOPHILS # BLD AUTO: 0.04 K/UL (ref 0–0.2)
BASOPHILS NFR BLD: 0.7 % (ref 0–1.9)
BILIRUB SERPL-MCNC: 0.5 MG/DL (ref 0.1–1)
BILIRUB UR QL STRIP: NEGATIVE
BUN SERPL-MCNC: 10 MG/DL (ref 6–20)
CALCIUM SERPL-MCNC: 9.6 MG/DL (ref 8.7–10.5)
CHLORIDE SERPL-SCNC: 106 MMOL/L (ref 95–110)
CLARITY UR REFRACT.AUTO: CLEAR
CO2 SERPL-SCNC: 25 MMOL/L (ref 23–29)
COLOR UR AUTO: YELLOW
CREAT SERPL-MCNC: 1 MG/DL (ref 0.5–1.4)
DIFFERENTIAL METHOD: ABNORMAL
EOSINOPHIL # BLD AUTO: 0.1 K/UL (ref 0–0.5)
EOSINOPHIL NFR BLD: 2.4 % (ref 0–8)
ERYTHROCYTE [DISTWIDTH] IN BLOOD BY AUTOMATED COUNT: 12.6 % (ref 11.5–14.5)
EST. GFR  (NO RACE VARIABLE): >60 ML/MIN/1.73 M^2
GLUCOSE SERPL-MCNC: 86 MG/DL (ref 70–110)
GLUCOSE UR QL STRIP: NEGATIVE
HCT VFR BLD AUTO: 49.6 % (ref 40–54)
HGB BLD-MCNC: 17.2 G/DL (ref 14–18)
HGB UR QL STRIP: NEGATIVE
IMM GRANULOCYTES # BLD AUTO: 0.01 K/UL (ref 0–0.04)
IMM GRANULOCYTES NFR BLD AUTO: 0.2 % (ref 0–0.5)
KETONES UR QL STRIP: NEGATIVE
LEUKOCYTE ESTERASE UR QL STRIP: NEGATIVE
LIPASE SERPL-CCNC: 15 U/L (ref 4–60)
LYMPHOCYTES # BLD AUTO: 2.3 K/UL (ref 1–4.8)
LYMPHOCYTES NFR BLD: 41.9 % (ref 18–48)
MCH RBC QN AUTO: 31.1 PG (ref 27–31)
MCHC RBC AUTO-ENTMCNC: 34.7 G/DL (ref 32–36)
MCV RBC AUTO: 90 FL (ref 82–98)
MONOCYTES # BLD AUTO: 0.5 K/UL (ref 0.3–1)
MONOCYTES NFR BLD: 9.7 % (ref 4–15)
NEUTROPHILS # BLD AUTO: 2.5 K/UL (ref 1.8–7.7)
NEUTROPHILS NFR BLD: 45.1 % (ref 38–73)
NITRITE UR QL STRIP: NEGATIVE
NRBC BLD-RTO: 0 /100 WBC
PH UR STRIP: 7 [PH] (ref 5–8)
PLATELET # BLD AUTO: 168 K/UL (ref 150–450)
PMV BLD AUTO: 10.3 FL (ref 9.2–12.9)
POTASSIUM SERPL-SCNC: 3.6 MMOL/L (ref 3.5–5.1)
PROT SERPL-MCNC: 6.9 G/DL (ref 6–8.4)
PROT UR QL STRIP: NEGATIVE
RBC # BLD AUTO: 5.53 M/UL (ref 4.6–6.2)
SODIUM SERPL-SCNC: 141 MMOL/L (ref 136–145)
SP GR UR STRIP: 1.03 (ref 1–1.03)
TROPONIN I SERPL DL<=0.01 NG/ML-MCNC: 0.01 NG/ML (ref 0–0.03)
URN SPEC COLLECT METH UR: NORMAL
WBC # BLD AUTO: 5.46 K/UL (ref 3.9–12.7)

## 2023-06-15 PROCEDURE — 85025 COMPLETE CBC W/AUTO DIFF WBC: CPT | Performed by: EMERGENCY MEDICINE

## 2023-06-15 PROCEDURE — 81003 URINALYSIS AUTO W/O SCOPE: CPT | Performed by: EMERGENCY MEDICINE

## 2023-06-15 PROCEDURE — 99284 PR EMERGENCY DEPT VISIT,LEVEL IV: ICD-10-PCS | Mod: ,,, | Performed by: EMERGENCY MEDICINE

## 2023-06-15 PROCEDURE — 80053 COMPREHEN METABOLIC PANEL: CPT | Performed by: EMERGENCY MEDICINE

## 2023-06-15 PROCEDURE — 25500020 PHARM REV CODE 255: Performed by: EMERGENCY MEDICINE

## 2023-06-15 PROCEDURE — 99284 EMERGENCY DEPT VISIT MOD MDM: CPT | Mod: ,,, | Performed by: EMERGENCY MEDICINE

## 2023-06-15 PROCEDURE — 25000003 PHARM REV CODE 250: Performed by: EMERGENCY MEDICINE

## 2023-06-15 PROCEDURE — 99285 EMERGENCY DEPT VISIT HI MDM: CPT | Mod: 25

## 2023-06-15 PROCEDURE — 84484 ASSAY OF TROPONIN QUANT: CPT | Performed by: EMERGENCY MEDICINE

## 2023-06-15 PROCEDURE — 83690 ASSAY OF LIPASE: CPT | Performed by: EMERGENCY MEDICINE

## 2023-06-15 RX ORDER — FAMOTIDINE 20 MG/1
20 TABLET, FILM COATED ORAL
Status: COMPLETED | OUTPATIENT
Start: 2023-06-15 | End: 2023-06-15

## 2023-06-15 RX ORDER — MAG HYDROX/ALUMINUM HYD/SIMETH 200-200-20
5 SUSPENSION, ORAL (FINAL DOSE FORM) ORAL
Status: COMPLETED | OUTPATIENT
Start: 2023-06-15 | End: 2023-06-15

## 2023-06-15 RX ADMIN — IOHEXOL 100 ML: 350 INJECTION, SOLUTION INTRAVENOUS at 10:06

## 2023-06-15 RX ADMIN — ALUMINUM HYDROXIDE, MAGNESIUM HYDROXIDE, AND SIMETHICONE 5 ML: 200; 200; 20 SUSPENSION ORAL at 10:06

## 2023-06-15 RX ADMIN — FAMOTIDINE 20 MG: 20 TABLET ORAL at 10:06

## 2023-06-15 NOTE — ED NOTES
Patient identifiers verified and correct for  Mr Pascal  C/C:  ABd pain , back pain SEE NN  APPEARANCE: awake and alert in NAD. PAIN  10/10  SKIN: warm, dry and intact. No breakdown or bruising.  MUSCULOSKELETAL: Patient moving all extremities spontaneously, no obvious swelling or deformities noted. Ambulates independently.  RESPIRATORY: Denies shortness of breath.Respirations unlabored.   CARDIAC: Denies CP, 2+ distal pulses; no peripheral edema  ABDOMEN:ABdomen soft, reports left upper abd pain   : voids spontaneously, denies difficulty  Neurologic: AAO x 4; follows commands equal strength in all extremities; denies numbness/tingling. Denies dizziness  Denies new weknaess, reports right hand pain

## 2023-06-15 NOTE — ED NOTES
"Patient states stomach, back and hand pain x 1 week, reports upper abd pain, reports " warm " pain, reports trino back pain. Also states right hand pain since surgery since last year, states he has no pills to stop the pain   "

## 2023-06-15 NOTE — ED PROVIDER NOTES
Encounter Date: 6/15/2023       History     Chief Complaint   Patient presents with    Back Pain    Abdominal Pain    Hand Pain     Was seen at [facility] 2 days ago for the same mervathng     43-year-old male, history of hearing impairment, here with multiple complaints.  Patient states that he has back pain that is diffuse and constant, abdominal pain, primarily localized to his left upper quadrant, described as a warm feeling.  Not related to food intake.  Pain in his back and abdomen has been present for a week.  Patient reports anorexia associated with it.  Also complaining of right hand pain x1 year.  Patient was seen at [facility] 2 days ago with similar symptoms.  He had a full set of lab work done that was unremarkable.  He had no imaging performed.    The history is provided by the patient. The history is limited by a language barrier. A  was used.   Review of patient's allergies indicates:  No Known Allergies  Past Medical History:   Diagnosis Date    Deafness in right ear     uses hearing aid     History reviewed. No pertinent surgical history.  History reviewed. No pertinent family history.  Social History     Tobacco Use    Smoking status: Never    Smokeless tobacco: Never   Substance Use Topics    Alcohol use: Yes    Drug use: No     Review of Systems    Physical Exam     Initial Vitals   BP Pulse Resp Temp SpO2   06/15/23 0942 06/15/23 0929 06/15/23 0929 06/15/23 0929 06/15/23 0929   (!) 149/86 63 16 98.3 °F (36.8 °C) 96 %      MAP       --                Physical Exam    Nursing note and vitals reviewed.  Constitutional: Vital signs are normal. He appears well-developed and well-nourished. He is not diaphoretic.  Non-toxic appearance. He does not appear ill. No distress.   HENT:   Head: Normocephalic and atraumatic.   Mouth/Throat: Mucous membranes are normal. Mucous membranes are not dry.   Eyes: Conjunctivae and lids are normal.   Neck: Neck supple.   Normal range of  motion.  Cardiovascular:  Normal rate and regular rhythm.           Pulmonary/Chest: Breath sounds normal. No respiratory distress.   Abdominal: Abdomen is soft. He exhibits no distension. There is no abdominal tenderness. There is no rebound and no guarding.   Musculoskeletal:         General: No edema.      Cervical back: Normal range of motion and neck supple.      Comments: Right hand carefully inspected.  There is no deformity, swelling, erythema, warmth, tenderness.  Patient has a healed surgical scar over his dorsal hand.  Patient is able to make a fist, extend all digits, abduct all digits without any limitations or difficulties.     Neurological: He is alert and oriented to person, place, and time.   Skin: Skin is dry and intact. No pallor.   Psychiatric: He has a normal mood and affect. His speech is normal and behavior is normal.       ED Course   Procedures  Labs Reviewed   CBC W/ AUTO DIFFERENTIAL - Abnormal; Notable for the following components:       Result Value    MCH 31.1 (*)     All other components within normal limits   COMPREHENSIVE METABOLIC PANEL   LIPASE   URINALYSIS, REFLEX TO URINE CULTURE    Narrative:     Specimen Source->Urine   TROPONIN I          Imaging Results              CT Abdomen Pelvis With Contrast (Final result)  Result time 06/15/23 10:45:45      Final result by Jorge Luis Gray MD (06/15/23 10:45:45)                   Impression:      1. No definite acute findings identified in the abdomen or pelvis to account for the patient's reported symptoms.  2. When compared to 01/09/2023 CT, no short-term change in appearance of right-sided septated renal cysts, as seen on more remote ultrasound examination of 04/26/2016.  3. Redemonstrated indeterminate right hepatic lobe hypodense lesion, for which further characterization with dedicated hepatic mass protocol MRI or CT would be recommended, if not previously performed elsewhere.  4. Additional details, as provided in the body of  the report.      Electronically signed by: Jorge Luis Gray  Date:    06/15/2023  Time:    10:45               Narrative:    EXAMINATION:  CT ABDOMEN PELVIS WITH CONTRAST    CLINICAL HISTORY:  Abdominal pain, acute, nonlocalized;    TECHNIQUE:  Low dose axial images, sagittal and coronal reformations were obtained from the lung bases to the pubic symphysis following the IV administration of 100 mL of Omnipaque 350    COMPARISON:  CT of the abdomen and pelvis performed 01/09/2023.    FINDINGS:  Lower chest: Unremarkable.    Liver: Normal contour.  Redemonstrated technically indeterminate hypodense lesion in the right hepatic lobe measuring up to at least 11 mm (axial series 2, image 37).  Additional subcentimeter lesions are too small to definitely characterize.    Gallbladder and bile ducts: Unremarkable. No biliary ductal dilatation.    Pancreas: Unremarkable.    Spleen: Unremarkable.    Adrenals: Unremarkable.    Kidneys:    Orthotopic position of the right kidney.  Redemonstrated cysts versus multi septated cystic lesion in the right lower pole.    Ectopic position of a malrotated left kidney within the left lower pelvis.  Several subcentimeter hypoattenuating lesions are too small to definitely characterize.  As shown previously, the left renal artery arises from the anterior aspect of the abdominal aorta at approximately L3-4 level.    Lymph nodes: No abdominal or pelvic lymphadenopathy.    Bowel and mesentery: No definite evidence of bowel obstruction.  Normal appendix.  Mild colonic diverticulosis.    Abdominal aorta: Unremarkable.    Inferior vena cava: Unremarkable.    Free fluid or free air: None.    Pelvis: Unremarkable.    Body wall: Unremarkable.    Bones: Unremarkable.                                       Medications   famotidine tablet 20 mg (20 mg Oral Given 6/15/23 1025)   aluminum-magnesium hydroxide-simethicone 200-200-20 mg/5 mL suspension 5 mL (5 mLs Oral Given 6/15/23 1026)   iohexoL (OMNIPAQUE  350) injection 100 mL (100 mLs Intravenous Given 6/15/23 1004)     Medical Decision Making:   History:   I obtained history from: someone other than patient.       <> Summary of History: mother  Old Medical Records: I decided to obtain old medical records.  Old Records Summarized: records from clinic visits and records from another hospital.  Initial Assessment:   DDx for abdominal pain would include cholecystitis, appendicitis, diverticulitis, pancreatitis, cholangitis, hepatitis, bowel perforation or obstruction, volvulus, gastritis, renal colic, vascular catastrophe like AAA, aortic dissection, or mesenteric ischemia.  In a young female, would consider additional pathology like ectopic pregnancy, TOA/PID, ovarian torsion.  Other less dangerous problems such as gastroparesis, cyclic vomiting syndrome, and constipation are also possible.    -This patient does not need emergent laboratory testing  -Medications to be administered: pepcid, maalox  -Emergent imaging is not indicated at this time  -Disposition: discharge if ED work-up negative    Clinical Tests:   Lab Tests: Ordered and Reviewed                        Clinical Impression:   Final diagnoses:  [R10.9] Abdominal pain, unspecified abdominal location (Primary)        ED Disposition Condition    Discharge Stable          ED Prescriptions    None       Follow-up Information       Follow up With Specialties Details Why Contact Info Additional Information    [facility] Internal Medicine Schedule an appointment as soon as possible for a visit   [address]  [facility] [address] [phone] [facility] Please park in surface lot and check in at central registration desk             Bibi Bacon MD  06/15/23 6719

## 2023-07-01 ENCOUNTER — HOSPITAL ENCOUNTER (EMERGENCY)
Facility: HOSPITAL | Age: 43
Discharge: HOME | End: 2023-07-01
Attending: EMERGENCY MEDICINE
Payer: COMMERCIAL

## 2023-07-01 VITALS
TEMPERATURE: 99 F | HEART RATE: 70 BPM | RESPIRATION RATE: 18 BRPM | BODY MASS INDEX: 25.73 KG/M2 | SYSTOLIC BLOOD PRESSURE: 173 MMHG | OXYGEN SATURATION: 97 % | DIASTOLIC BLOOD PRESSURE: 103 MMHG | WEIGHT: 190 LBS | HEIGHT: 72 IN

## 2023-07-01 DIAGNOSIS — R93.89 ABNORMAL X-RAY: ICD-10-CM

## 2023-07-01 DIAGNOSIS — R03.0 ELEVATED BLOOD PRESSURE READING: ICD-10-CM
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
## 2023-07-01 PROCEDURE — 25000003 PHARM REV CODE 250: Performed by: PHYSICIAN ASSISTANT

## 2023-07-01 PROCEDURE — 99284 EMERGENCY DEPT VISIT MOD MDM: CPT | Mod: 25

## 2023-07-01 RX ORDER — METHOCARBAMOL 500 MG/1
1000 TABLET, FILM COATED ORAL 2 TIMES DAILY PRN
Qty: 10 TABLET | Refills: 0 | Status: SHIPPED | OUTPATIENT
Start: 2023-07-01 | End: 2023-07-06

## 2023-07-01 RX ORDER — IBUPROFEN 600 MG/1
600 TABLET ORAL
Status: COMPLETED | OUTPATIENT
Start: 2023-07-01 | End: 2023-07-01

## 2023-07-01 RX ADMIN — IBUPROFEN 600 MG: 600 TABLET, FILM COATED ORAL at 06:07
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
## 2023-07-02 NOTE — DISCHARGE INSTRUCTIONS
Follow-up with your primary care provider for further evaluation.    You had an incidental lesion seen on your right scapula on your chest x-ray that you should discuss with your primary care provider.     Return to the emergency room for new, worsening, or concerning symptoms.

## 2023-08-14 ENCOUNTER — HOSPITAL ENCOUNTER (EMERGENCY)
Facility: HOSPITAL | Age: 43
Discharge: HOME | End: 2023-08-14
Attending: EMERGENCY MEDICINE
Payer: MEDICARE

## 2023-08-14 VITALS
TEMPERATURE: 98 F | WEIGHT: 190 LBS | HEART RATE: 64 BPM | BODY MASS INDEX: 25.73 KG/M2 | SYSTOLIC BLOOD PRESSURE: 144 MMHG | RESPIRATION RATE: 16 BRPM | DIASTOLIC BLOOD PRESSURE: 105 MMHG | HEIGHT: 72 IN | OXYGEN SATURATION: 99 %

## 2023-08-14 DIAGNOSIS — M25.519 SHOULDER PAIN: ICD-10-CM

## 2023-08-14 DIAGNOSIS — M77.9 TENDONITIS: Primary | ICD-10-CM

## 2023-08-14 DIAGNOSIS — I10 HYPERTENSION, UNSPECIFIED TYPE: ICD-10-CM

## 2023-08-14 PROCEDURE — 99284 EMERGENCY DEPT VISIT MOD MDM: CPT

## 2023-08-14 PROCEDURE — 93010 ELECTROCARDIOGRAM REPORT: CPT | Mod: ,,, | Performed by: INTERNAL MEDICINE

## 2023-08-14 PROCEDURE — 25000003 PHARM REV CODE 250: Performed by: EMERGENCY MEDICINE

## 2023-08-14 PROCEDURE — 93010 EKG 12-LEAD: ICD-10-PCS | Mod: ,,, | Performed by: INTERNAL MEDICINE

## 2023-08-14 PROCEDURE — 93005 ELECTROCARDIOGRAM TRACING: CPT

## 2023-08-14 RX ORDER — AMLODIPINE BESYLATE 5 MG/1
5 TABLET ORAL DAILY
Qty: 30 TABLET | Refills: 1 | Status: SHIPPED | OUTPATIENT
Start: 2023-08-14 | End: 2023-10-13

## 2023-08-14 RX ORDER — NAPROXEN 500 MG/1
500 TABLET ORAL 2 TIMES DAILY WITH MEALS
Qty: 6 TABLET | Refills: 0 | Status: SHIPPED | OUTPATIENT
Start: 2023-08-14 | End: 2023-08-19

## 2023-08-14 RX ORDER — LIDOCAINE 50 MG/G
1 PATCH TOPICAL
Status: DISCONTINUED | OUTPATIENT
Start: 2023-08-14 | End: 2023-08-14 | Stop reason: HOSPADM

## 2023-08-14 RX ORDER — BACLOFEN 10 MG/1
20 TABLET ORAL
Status: COMPLETED | OUTPATIENT
Start: 2023-08-14 | End: 2023-08-14

## 2023-08-14 RX ORDER — NAPROXEN 500 MG/1
500 TABLET ORAL
Status: COMPLETED | OUTPATIENT
Start: 2023-08-14 | End: 2023-08-14

## 2023-08-14 RX ADMIN — BACLOFEN 20 MG: 10 TABLET ORAL at 05:08

## 2023-08-14 RX ADMIN — NAPROXEN 500 MG: 500 TABLET ORAL at 05:08

## 2023-08-14 RX ADMIN — LIDOCAINE 5% 1 PATCH: 700 PATCH TOPICAL at 05:08

## 2023-08-14 NOTE — DISCHARGE INSTRUCTIONS
Imaging Results              X-Ray Shoulder Trauma Right (Final result)  Result time 08/14/23 18:30:24      Final result by Yaya Bazzi MD (08/14/23 18:30:24)                   Impression:      No acute displaced fracture-dislocation identified.    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      Electronically signed by: Yaya Bazzi MD  Date:    08/14/2023  Time:    18:30               Narrative:    EXAMINATION:  XR SHOULDER TRAUMA 3 VIEW RIGHT    CLINICAL HISTORY:  Enthesopathy, unspecified    TECHNIQUE:  Three or four views of the right shoulder were performed.    COMPARISON:  Chest radiograph 07/01/2023    FINDINGS:  Bones are well mineralized. Overall alignment is within normal limits.  Grossly stable 6 mm slightly expansile rounded well-circumscribed lytic focus with thin sclerotic rim at the superior angle of the right scapula.  No displaced fracture, dislocation or destructive osseous process.  Minimal spurring of the inferior glenoid.  No subcutaneous emphysema or radiodense retained foreign body.  Right lung apex is clear.

## 2023-08-14 NOTE — ED PROVIDER NOTES
Encounter Date: 8/14/2023       History     Chief Complaint   Patient presents with    Shoulder Pain     Ran out of pain pills pain to r shoulder      HPI  44 Y/O right-hand dominant and F with hearing aids male presents with atraumatic right shoulder pain with no reported numbness or weakness to elbow wrist or hand.  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  No fever or chills, history of IV drug use or other complaints.  Despite pain he is able to do his daily activities at baseline.  Review of patient's allergies indicates:  No Known Allergies  Past Medical History:   Diagnosis Date    Deafness in right ear     uses hearing aid     No past surgical history on file.  No family history on file.  Social History     Tobacco Use    Smoking status: Never    Smokeless tobacco: Never   Substance Use Topics    Alcohol use: Yes    Drug use: No     Review of Systems    Physical Exam     Initial Vitals [08/14/23 1606]   BP Pulse Resp Temp SpO2   (!) 178/109 76 18 98.5 °F (36.9 °C) 98 %      MAP       --         Physical Exam    Constitutional: He appears well-developed and well-nourished. He is not diaphoretic. He is active and cooperative.  Non-toxic appearance. He does not have a sickly appearance. He does not appear ill. No distress.   Eyes: No scleral icterus.   Neck: Phonation normal.   Cardiovascular:  Intact distal pulses.           Hypertension   Pulmonary/Chest: No respiratory distress. He has no wheezes. He has no rhonchi. He has no rales. He exhibits tenderness (Tenderness to palpation over right pectoralis insertion at the shoulder joint.).   Musculoskeletal:      Right shoulder: Tenderness (tenderness to palpation at the insertion of supraspinatus, teres minor and anterior pectoralis muscles) present. No swelling, deformity, effusion, laceration, bony  tenderness or crepitus. Normal range of motion. Normal strength. Normal pulse.      Right upper arm: Normal.      Right elbow: Normal.      Right forearm: Normal.     Neurological: He is alert and oriented to person, place, and time.         ED Course   Procedures  Labs Reviewed - No data to display  EKG Readings: (Independently Interpreted)   Sinus rhythm at a rate of 75 beats per minute with normal ventricular axis; intervals within normal limits and no STEMI.       Imaging Results              X-Ray Shoulder Trauma Right (Final result)  Result time 08/14/23 18:30:24      Final result by Yaya Bzazi MD (08/14/23 18:30:24)                   Impression:      No acute displaced fracture-dislocation identified.    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      Electronically signed by: Yaya Bazzi MD  Date:    08/14/2023  Time:    18:30               Narrative:    EXAMINATION:  XR SHOULDER TRAUMA 3 VIEW RIGHT    CLINICAL HISTORY:  Enthesopathy, unspecified    TECHNIQUE:  Three or four views of the right shoulder were performed.    COMPARISON:  Chest radiograph 07/01/2023    FINDINGS:  Bones are well mineralized. Overall alignment is within normal limits.  Grossly stable 6 mm slightly expansile rounded well-circumscribed lytic focus with thin sclerotic rim at the superior angle of the right scapula.  No displaced fracture, dislocation or destructive osseous process.  Minimal spurring of the inferior glenoid.  No subcutaneous emphysema or radiodense retained foreign body.  Right lung apex is clear.                                    X-Rays:   Independently Interpreted Readings:   Other Readings:  No fractures.    Medications   LIDOcaine 5 % patch 1 patch (1 patch Transdermal Patch Applied 8/14/23 1752)   naproxen tablet 500 mg (500 mg Oral Given 8/14/23 1753)   baclofen tablet 20 mg (20 mg Oral Given 8/14/23  1752)     Medical Decision Making:   History:   Old Medical Records: I decided to obtain old medical records.  Initial Assessment:   Appearing, afebrile, atraumatic and neurovascularly intact right-hand dominant male presents with signs and symptoms of pectoralis minor/major tendinitis at the insertion of shoulder.  Muscle pain over supraspinatus of right shoulder region.  Deficits distal to shoulder joint.  Minimal to no suspicion for fracture, however, family strongly requested imaging which I will comply.  Discussed need to rest, apply ice, especially after his job related repetitive movements of shoulder joint and anti-inflammatories, which we acknowledged.  Will perform x-ray and discharge with anti-inflammatories and referred to outpatient physical therapy.  ____________________  Dax Sequeira MD, [facility]  Emergency Medicine Staff  5:40 PM 8/14/2023    Independently Interpreted Test(s):   I have ordered and independently interpreted X-rays - see prior notes.  Clinical Tests:   Radiological Study: Ordered and Reviewed                          Clinical Impression:   Final diagnoses:  [M25.519] Shoulder pain  [M77.9] Tendonitis (Primary)  [I10] Hypertension, unspecified type        ED Disposition Condition    Discharge Stable          ED Prescriptions       Medication Sig Dispense Start Date End Date Auth. Provider    naproxen (NAPROSYN) 500 MG tablet Take 1 tablet (500 mg total) by mouth 2 (two) times daily with meals. for 5 days 6 tablet 8/14/2023 8/19/2023 Ryan Sequeira MD    amLODIPine (NORVASC) 5 MG tablet Take 1 tablet (5 mg total) by mouth once daily. 30 tablet 8/14/2023 10/13/2023 Ryan Sequeira MD          Follow-up Information    None       Aware of hypertension, which appears that and multiple previous [facility] visits patient has been elevated blood pressure documented.  Will initiate 5 mg of amlodipine q.day and placed referral for Internal Medicine for continued outpatient care.  No evidence of  end-organ damage at this time that may warrant emergent IV blood pressure reduction or further emergency department management.  ____________________  Dax Sequeira MD, [facility]  Emergency Medicine Staff  6:54 PM 8/14/2023       Ryan Sequeira MD  08/14/23 1854       Ryan Sequeira MD  08/14/23 1859
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
## 2024-05-16 ENCOUNTER — HOSPITAL ENCOUNTER (EMERGENCY)
Facility: HOSPITAL | Age: 44
Discharge: HOME | End: 2024-05-16
Attending: EMERGENCY MEDICINE
Payer: MEDICARE

## 2024-05-16 VITALS
DIASTOLIC BLOOD PRESSURE: 96 MMHG | SYSTOLIC BLOOD PRESSURE: 163 MMHG | RESPIRATION RATE: 18 BRPM | WEIGHT: 190 LBS | HEART RATE: 72 BPM | HEIGHT: 72 IN | TEMPERATURE: 99 F | BODY MASS INDEX: 25.73 KG/M2 | OXYGEN SATURATION: 95 %

## 2024-05-16 DIAGNOSIS — M54.50 ACUTE MIDLINE LOW BACK PAIN WITHOUT SCIATICA: Primary | ICD-10-CM

## 2024-05-16 PROCEDURE — 99284 EMERGENCY DEPT VISIT MOD MDM: CPT | Mod: 25

## 2024-05-16 PROCEDURE — 63600175 PHARM REV CODE 636 W HCPCS: Performed by: PHYSICIAN ASSISTANT

## 2024-05-16 PROCEDURE — 96372 THER/PROPH/DIAG INJ SC/IM: CPT | Performed by: PHYSICIAN ASSISTANT

## 2024-05-16 RX ORDER — KETOROLAC TROMETHAMINE 30 MG/ML
15 INJECTION, SOLUTION INTRAMUSCULAR; INTRAVENOUS
Status: COMPLETED | OUTPATIENT
Start: 2024-05-16 | End: 2024-05-16

## 2024-05-16 RX ORDER — IBUPROFEN 800 MG/1
800 TABLET ORAL EVERY 6 HOURS PRN
Qty: 20 TABLET | Refills: 0 | Status: SHIPPED | OUTPATIENT
Start: 2024-05-16

## 2024-05-16 RX ADMIN — KETOROLAC TROMETHAMINE 15 MG: 30 INJECTION, SOLUTION INTRAMUSCULAR; INTRAVENOUS at 01:05

## 2024-05-16 NOTE — ED NOTES
Discharge home with family, states understanding to follow up as directed. Ambulates out of ED without difficulty. Rx sent, adrian shot time wihtout difficulty

## 2024-05-16 NOTE — ED PROVIDER NOTES
Encounter Date: 5/16/2024       History     Chief Complaint   Patient presents with    Back Pain     Back pain since Monday rates 10/10. Ambulatory in triage. Denies urinary Sx.      1:24 PM  [employer] interpretation services ([facility]) used.     Patient is a 44-year-old male with a history of hearing impairment who presents to [facility] ED for emergent evaluation of midline lumbar back pain onset Monday, 3 days ago.    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  He denies any falls or trauma.  He reports acute onset of lumbar, midline back pain that is cramping and throbbing in nature.  Nothing makes it worse.  He has had intermittent diarrhea in the past 2 days. He denies fever, chills, SOB, IVDU, b/b incontinence, saddle anesthesia, LE paresthesias, drug allergies. He is still ambulatory. He did not take any medications for pain.         Review of patient's allergies indicates:  No Known Allergies  Past Medical History:   Diagnosis Date    Deafness in right ear     uses hearing aid     No past surgical history on file.  No family history on file.  Social History     Tobacco Use    Smoking status: Never    Smokeless tobacco: Never   Substance Use Topics    Alcohol use: Yes    Drug use: No     Review of Systems   Constitutional:  Negative for activity change and fever.   HENT:  Negative for sore throat.    Respiratory:  Negative for shortness of breath.    Gastrointestinal:  Negative for abdominal pain, constipation, diarrhea, nausea and vomiting.   Genitourinary:  Negative for difficulty urinating (no bladder incont), dysuria, frequency and hematuria.   Musculoskeletal:  Positive for back pain. Negative for myalgias.   Skin:  Negative for rash.   Neurological:  Negative for weakness and numbness (no saddle anesthesias).   Hematological:  Does not bruise/bleed easily.       Physical Exam     Initial Vitals [05/16/24 1312]   BP Pulse Resp Temp SpO2   (!) 163/96 72 18 98.5 °F (36.9 °C) 95 %      MAP        --         Physical Exam    Vitals reviewed.  Constitutional: He appears well-developed and well-nourished. He is not diaphoretic. He is cooperative.  Non-toxic appearance. He does not have a sickly appearance. He does not appear ill. No distress.   HENT:   Head: Normocephalic and atraumatic.   Nose: Nose normal.   Mouth/Throat: No trismus in the jaw.   Eyes: Conjunctivae and EOM are normal.   Neck:   Normal range of motion.  Pulmonary/Chest: No accessory muscle usage. No tachypnea. No respiratory distress.   Abdominal: Abdomen is soft. He exhibits no distension. There is no abdominal tenderness. There is no rebound and no guarding.   Musculoskeletal:         General: Normal range of motion.      Cervical back: Normal range of motion. No bony tenderness. Normal range of motion.      Thoracic back: No bony tenderness. Normal range of motion.      Lumbar back: No swelling, edema, deformity, signs of trauma, lacerations, spasms, tenderness or bony tenderness. Normal range of motion. Negative right straight leg raise test and negative left straight leg raise test.      Right lower leg: No edema.      Left lower leg: No edema.      Comments: No unilateral leg swelling.   Sensations grossly intact.   FROM and 5/5 strength of trino LE.   Ambulates without assistance or difficulty.   Skin to area of pain without erythema, edema, wounds, rashes.     Neurological: He is alert. He has normal strength.   Skin: Skin is dry. No pallor.         ED Course   Procedures  Labs Reviewed - No data to display         Imaging Results    None          Medications   ketorolac injection 15 mg (15 mg Intramuscular Given 5/16/24 1350)     Medical Decision Making  Patient is a 44-year-old male with a history of hearing impairment who presents to [facility] ED for emergent evaluation of midline lumbar back pain onset Monday, 3 days ago.    Differential diagnosis includes but is not limited to strain, DDD, DJD, arthritis, other inflammatory arthritis,  prob not sacroiliitis since no point tenderness there.  He has no red flag symptoms.  Physical exam is reassuring.  Low suspicion for spinal cord compression or cauda equina syndrome.  No IV drug use.  Vitals are stable.  Nontoxic appearing.  Doubt back infection such as epidural abscess, diskitis, etc.     Patient does not need any labs or imaging at this time as it is of little utility.  I will treat patient conservatively.  He will like intramuscular injection of Toradol.  Continue ibuprofen 800 mg in 12 hours every 6 hours.  Ice.  Heat.  Follow-up with PCP if symptoms do not improve.  We discussed proper lifting techniques to avoid recurrent back pain.  All of patient's and his mother's questions were answered.  Patient comfortable with plan and stable for discharge.    Amount and/or Complexity of Data Reviewed  External Data Reviewed: labs.     Details: Last Cr wnl.     Risk  Prescription drug management.                                      Clinical Impression:  Final diagnoses:  [M54.50] Acute midline low back pain without sciatica (Primary)          ED Disposition Condition    Discharge Stable          ED Prescriptions       Medication Sig Dispense Start Date End Date Auth. Provider    ibuprofen (ADVIL,MOTRIN) 800 MG tablet Take 1 tablet (800 mg total) by mouth every 6 (six) hours as needed for Pain. 20 tablet 5/16/2024 -- Lamar Canseco PA-C          Follow-up Information       Follow up With Specialties Details Why Contact Info Additional Information    [facility] Internal Medicine Schedule an appointment as soon as possible for a visit   [address]  [facility] [address] [phone] [facility] Please park in surface lot and check in at central registration desk    [facility] - Emergency Dept Emergency Medicine  If symptoms worsen [address]  [facility] [address] [phone]                Lamar Canseco  JO ANN  05/16/24 1417

## 2024-05-16 NOTE — DISCHARGE INSTRUCTIONS
Take ibuprofen 800 mg every 6 hours as needed with food for anti-inflammatory relief. Do not use other over the counter NSAIDs at the same time.  You can take acetaminophen/tylenol 650 mg every 6 hours or 1000 mg every 8 hours for added relief.  Apply ice to the area for 10-20 minutes every 4 hours. You can apply heat 2 days after for the same duration and frequency.  Follow up with PCP for reevaluation and annual wellness visit.  Return to the ER for new or worsening symptoms.

## 2025-03-02 ENCOUNTER — HOSPITAL ENCOUNTER (EMERGENCY)
Facility: HOSPITAL | Age: 45
Discharge: HOME | End: 2025-03-02
Attending: EMERGENCY MEDICINE
Payer: MEDICARE

## 2025-03-02 VITALS
TEMPERATURE: 98 F | DIASTOLIC BLOOD PRESSURE: 108 MMHG | RESPIRATION RATE: 16 BRPM | BODY MASS INDEX: 25.33 KG/M2 | WEIGHT: 208 LBS | HEIGHT: 76 IN | HEART RATE: 79 BPM | OXYGEN SATURATION: 100 % | SYSTOLIC BLOOD PRESSURE: 169 MMHG

## 2025-03-02 DIAGNOSIS — Q63.9 CONGENITAL ANOMALY OF KIDNEY: ICD-10-CM

## 2025-03-02 DIAGNOSIS — R10.32 LEFT LOWER QUADRANT ABDOMINAL PAIN: Primary | ICD-10-CM

## 2025-03-02 DIAGNOSIS — N20.1 URETERAL STONE: ICD-10-CM

## 2025-03-02 DIAGNOSIS — N28.1 CYST OF RIGHT KIDNEY: ICD-10-CM

## 2025-03-02 PROBLEM — B18.2 CHRONIC HEPATITIS C WITHOUT HEPATIC COMA: Status: ACTIVE | Noted: 2019-01-23

## 2025-03-02 PROBLEM — K21.9 GASTROESOPHAGEAL REFLUX DISEASE WITHOUT ESOPHAGITIS: Status: ACTIVE | Noted: 2019-01-24

## 2025-03-02 LAB
ALBUMIN SERPL BCP-MCNC: 4.5 G/DL (ref 3.5–5.2)
ALLENS TEST: NORMAL
ALP SERPL-CCNC: 84 U/L (ref 40–150)
ALT SERPL W/O P-5'-P-CCNC: 20 U/L (ref 10–44)
ANION GAP SERPL CALC-SCNC: 13 MMOL/L (ref 8–16)
AST SERPL-CCNC: 25 U/L (ref 10–40)
BACTERIA #/AREA URNS AUTO: ABNORMAL /HPF
BASOPHILS # BLD AUTO: 0.02 K/UL (ref 0–0.2)
BASOPHILS NFR BLD: 0.2 % (ref 0–1.9)
BILIRUB SERPL-MCNC: 0.9 MG/DL (ref 0.1–1)
BILIRUB UR QL STRIP: NEGATIVE
BIPAP: 0
BUN SERPL-MCNC: 8 MG/DL (ref 6–20)
CALCIUM SERPL-MCNC: 10 MG/DL (ref 8.7–10.5)
CHLORIDE SERPL-SCNC: 107 MMOL/L (ref 95–110)
CLARITY UR REFRACT.AUTO: CLEAR
CO2 SERPL-SCNC: 22 MMOL/L (ref 23–29)
COLOR UR AUTO: YELLOW
CREAT SERPL-MCNC: 1.3 MG/DL (ref 0.5–1.4)
DIFFERENTIAL METHOD BLD: ABNORMAL
EOSINOPHIL # BLD AUTO: 0 K/UL (ref 0–0.5)
EOSINOPHIL NFR BLD: 0.2 % (ref 0–8)
ERYTHROCYTE [DISTWIDTH] IN BLOOD BY AUTOMATED COUNT: 12.9 % (ref 11.5–14.5)
EST. GFR  (NO RACE VARIABLE): >60 ML/MIN/1.73 M^2
FIO2: 21 %
GLUCOSE SERPL-MCNC: 166 MG/DL (ref 70–110)
GLUCOSE UR QL STRIP: NEGATIVE
HCT VFR BLD AUTO: 48.1 % (ref 40–54)
HCV AB SERPL QL IA: REACTIVE
HGB BLD-MCNC: 17 G/DL (ref 14–18)
HGB UR QL STRIP: ABNORMAL
HIV 1+2 AB+HIV1 P24 AG SERPL QL IA: NORMAL
IMM GRANULOCYTES # BLD AUTO: 0.06 K/UL (ref 0–0.04)
IMM GRANULOCYTES NFR BLD AUTO: 0.7 % (ref 0–0.5)
KETONES UR QL STRIP: ABNORMAL
LDH SERPL L TO P-CCNC: 1.35 MMOL/L (ref 0.5–2.2)
LDH SERPL L TO P-CCNC: 3.4 MMOL/L
LEUKOCYTE ESTERASE UR QL STRIP: NEGATIVE
LIPASE SERPL-CCNC: 11 U/L (ref 4–60)
LYMPHOCYTES # BLD AUTO: 0.9 K/UL (ref 1–4.8)
LYMPHOCYTES NFR BLD: 11.5 % (ref 18–48)
MCH RBC QN AUTO: 31.2 PG (ref 27–31)
MCHC RBC AUTO-ENTMCNC: 35.3 G/DL (ref 32–36)
MCV RBC AUTO: 88 FL (ref 82–98)
MICROSCOPIC COMMENT: ABNORMAL
MONOCYTES # BLD AUTO: 0.5 K/UL (ref 0.3–1)
MONOCYTES NFR BLD: 5.9 % (ref 4–15)
NEUTROPHILS # BLD AUTO: 6.6 K/UL (ref 1.8–7.7)
NEUTROPHILS NFR BLD: 81.5 % (ref 38–73)
NITRITE UR QL STRIP: NEGATIVE
NRBC BLD-RTO: 0 /100 WBC
PH UR STRIP: 8 [PH] (ref 5–8)
PLATELET # BLD AUTO: 222 K/UL (ref 150–450)
PMV BLD AUTO: 10.3 FL (ref 9.2–12.9)
POC PERFORMED BY: NORMAL
POC TEMPERATURE: 37 C
POTASSIUM SERPL-SCNC: 3.7 MMOL/L (ref 3.5–5.1)
PROT SERPL-MCNC: 7.9 G/DL (ref 6–8.4)
PROT UR QL STRIP: ABNORMAL
RBC # BLD AUTO: 5.45 M/UL (ref 4.6–6.2)
RBC #/AREA URNS AUTO: 16 /HPF (ref 0–4)
SAMPLE: NORMAL
SITE: NORMAL
SODIUM SERPL-SCNC: 142 MMOL/L (ref 136–145)
SP GR UR STRIP: >=1.03 (ref 1–1.03)
SPECIMEN SOURCE: NORMAL
URN SPEC COLLECT METH UR: ABNORMAL
WBC # BLD AUTO: 8.1 K/UL (ref 3.9–12.7)
WBC #/AREA URNS AUTO: 3 /HPF (ref 0–5)

## 2025-03-02 PROCEDURE — 83690 ASSAY OF LIPASE: CPT | Performed by: PHYSICIAN ASSISTANT

## 2025-03-02 PROCEDURE — 80053 COMPREHEN METABOLIC PANEL: CPT | Performed by: PHYSICIAN ASSISTANT

## 2025-03-02 PROCEDURE — 99285 EMERGENCY DEPT VISIT HI MDM: CPT | Mod: 25

## 2025-03-02 PROCEDURE — 85025 COMPLETE CBC W/AUTO DIFF WBC: CPT | Performed by: PHYSICIAN ASSISTANT

## 2025-03-02 PROCEDURE — 96361 HYDRATE IV INFUSION ADD-ON: CPT

## 2025-03-02 PROCEDURE — 99900035 HC TECH TIME PER 15 MIN (STAT)

## 2025-03-02 PROCEDURE — 25500020 PHARM REV CODE 255: Performed by: EMERGENCY MEDICINE

## 2025-03-02 PROCEDURE — 96376 TX/PRO/DX INJ SAME DRUG ADON: CPT

## 2025-03-02 PROCEDURE — 82803 BLOOD GASES ANY COMBINATION: CPT

## 2025-03-02 PROCEDURE — 86803 HEPATITIS C AB TEST: CPT | Performed by: PHYSICIAN ASSISTANT

## 2025-03-02 PROCEDURE — 63600175 PHARM REV CODE 636 W HCPCS: Mod: JZ,TB | Performed by: PHYSICIAN ASSISTANT

## 2025-03-02 PROCEDURE — 87389 HIV-1 AG W/HIV-1&-2 AB AG IA: CPT | Performed by: PHYSICIAN ASSISTANT

## 2025-03-02 PROCEDURE — 81001 URINALYSIS AUTO W/SCOPE: CPT | Performed by: PHYSICIAN ASSISTANT

## 2025-03-02 PROCEDURE — 25000003 PHARM REV CODE 250: Performed by: PHYSICIAN ASSISTANT

## 2025-03-02 PROCEDURE — 96374 THER/PROPH/DIAG INJ IV PUSH: CPT

## 2025-03-02 PROCEDURE — 83605 ASSAY OF LACTIC ACID: CPT

## 2025-03-02 PROCEDURE — 96375 TX/PRO/DX INJ NEW DRUG ADDON: CPT

## 2025-03-02 RX ORDER — ONDANSETRON 4 MG/1
4 TABLET, ORALLY DISINTEGRATING ORAL EVERY 8 HOURS PRN
Qty: 16 TABLET | Refills: 0 | Status: SHIPPED | OUTPATIENT
Start: 2025-03-02

## 2025-03-02 RX ORDER — ONDANSETRON 4 MG/1
4 TABLET, ORALLY DISINTEGRATING ORAL EVERY 8 HOURS PRN
Qty: 16 TABLET | Refills: 0 | Status: SHIPPED | OUTPATIENT
Start: 2025-03-02 | End: 2025-03-02

## 2025-03-02 RX ORDER — MORPHINE SULFATE 4 MG/ML
4 INJECTION, SOLUTION INTRAMUSCULAR; INTRAVENOUS
Refills: 0 | Status: COMPLETED | OUTPATIENT
Start: 2025-03-02 | End: 2025-03-02

## 2025-03-02 RX ORDER — IBUPROFEN 800 MG/1
800 TABLET ORAL EVERY 6 HOURS PRN
Qty: 20 TABLET | Refills: 0 | Status: SHIPPED | OUTPATIENT
Start: 2025-03-02 | End: 2025-03-02

## 2025-03-02 RX ORDER — OXYCODONE HYDROCHLORIDE 5 MG/1
5 TABLET ORAL EVERY 6 HOURS PRN
Qty: 12 TABLET | Refills: 0 | Status: SHIPPED | OUTPATIENT
Start: 2025-03-02

## 2025-03-02 RX ORDER — IBUPROFEN 800 MG/1
800 TABLET ORAL EVERY 6 HOURS PRN
Qty: 20 TABLET | Refills: 0 | Status: SHIPPED | OUTPATIENT
Start: 2025-03-02

## 2025-03-02 RX ORDER — TAMSULOSIN HYDROCHLORIDE 0.4 MG/1
0.4 CAPSULE ORAL
Status: COMPLETED | OUTPATIENT
Start: 2025-03-02 | End: 2025-03-02

## 2025-03-02 RX ORDER — OXYCODONE HYDROCHLORIDE 5 MG/1
5 TABLET ORAL EVERY 6 HOURS PRN
Qty: 12 TABLET | Refills: 0 | Status: SHIPPED | OUTPATIENT
Start: 2025-03-02 | End: 2025-03-02

## 2025-03-02 RX ORDER — KETOROLAC TROMETHAMINE 30 MG/ML
15 INJECTION, SOLUTION INTRAMUSCULAR; INTRAVENOUS
Status: COMPLETED | OUTPATIENT
Start: 2025-03-02 | End: 2025-03-02

## 2025-03-02 RX ADMIN — SODIUM CHLORIDE 1000 ML: 0.9 INJECTION, SOLUTION INTRAVENOUS at 03:03

## 2025-03-02 RX ADMIN — KETOROLAC TROMETHAMINE 15 MG: 30 INJECTION, SOLUTION INTRAMUSCULAR; INTRAVENOUS at 05:03

## 2025-03-02 RX ADMIN — MORPHINE SULFATE 4 MG: 4 INJECTION INTRAVENOUS at 03:03

## 2025-03-02 RX ADMIN — SODIUM CHLORIDE, SODIUM LACTATE, POTASSIUM CHLORIDE, AND CALCIUM CHLORIDE 1000 ML: .6; .31; .03; .02 INJECTION, SOLUTION INTRAVENOUS at 05:03

## 2025-03-02 RX ADMIN — TAMSULOSIN HYDROCHLORIDE 0.4 MG: 0.4 CAPSULE ORAL at 05:03

## 2025-03-02 RX ADMIN — IOHEXOL 100 ML: 350 INJECTION, SOLUTION INTRAVENOUS at 04:03

## 2025-03-02 RX ADMIN — MORPHINE SULFATE 4 MG: 4 INJECTION INTRAVENOUS at 05:03

## 2025-03-02 NOTE — ED PROVIDER NOTES
Encounter Date: 3/2/2025       History     Chief Complaint   Patient presents with    Abdominal Pain    Flank Pain    Nausea    Vomiting     45-year-old male with a PMHx of hearing impairment presents to the ED with left lower quadrant abdominal pain and flank pain x1 day.  He had nonbloody diarrhea yesterday. He has not tried treating this with over-the-counter medications.  He feels like he is running a fever.  He denies nausea, vomiting, dysuria, hematuria, chest pain, shortness of breath, bloody stool, melena.    The history is provided by the patient.     Review of patient's allergies indicates:  No Known Allergies  Past Medical History:   Diagnosis Date    Deafness in right ear     uses hearing aid     History reviewed. No pertinent surgical history.  No family history on file.  Social History[1]  Review of Systems    Physical Exam     Initial Vitals [03/02/25 1351]   BP Pulse Resp Temp SpO2   (!) 172/123 66 (!) 22 98.2 °F (36.8 °C) 98 %      MAP       --         Physical Exam    Nursing note and vitals reviewed.  Constitutional: He appears well-developed and well-nourished. He is not diaphoretic. No distress.   HENT:   Head: Normocephalic and atraumatic.   Nose: Nose normal.   Eyes: Conjunctivae and EOM are normal.   Neck: Neck supple.   Cardiovascular:  Normal rate.           Pulmonary/Chest: No respiratory distress.   Abdominal: There is abdominal tenderness in the left lower quadrant.   No right CVA tenderness.  No left CVA tenderness. There is guarding.   Musculoskeletal:      Cervical back: Neck supple.     Neurological: He is alert and oriented to person, place, and time.   Skin: No rash noted.   Psychiatric: He has a normal mood and affect. Thought content normal.         ED Course   Procedures  Labs Reviewed   HEPATITIS C ANTIBODY - Abnormal       Result Value    Hepatitis C Ab Reactive (*)     Narrative:     Release to patient->Immediate   CBC W/ AUTO DIFFERENTIAL - Abnormal    WBC 8.10      RBC 5.45       Hemoglobin 17.0      Hematocrit 48.1      MCV 88      MCH 31.2 (*)     MCHC 35.3      RDW 12.9      Platelets 222      MPV 10.3      Immature Granulocytes 0.7 (*)     Gran # (ANC) 6.6      Immature Grans (Abs) 0.06 (*)     Lymph # 0.9 (*)     Mono # 0.5      Eos # 0.0      Baso # 0.02      nRBC 0      Gran % 81.5 (*)     Lymph % 11.5 (*)     Mono % 5.9      Eosinophil % 0.2      Basophil % 0.2      Differential Method Automated     COMPREHENSIVE METABOLIC PANEL - Abnormal    Sodium 142      Potassium 3.7      Chloride 107      CO2 22 (*)     Glucose 166 (*)     BUN 8      Creatinine 1.3      Calcium 10.0      Total Protein 7.9      Albumin 4.5      Total Bilirubin 0.9      Alkaline Phosphatase 84      AST 25      ALT 20      eGFR >60.0      Anion Gap 13     URINALYSIS, REFLEX TO URINE CULTURE - Abnormal    Specimen UA Urine, Clean Catch      Color, UA Yellow      Appearance, UA Clear      pH, UA 8.0      Specific Gravity, UA >=1.030 (*)     Protein, UA Trace (*)     Glucose, UA Negative      Ketones, UA 2+ (*)     Bilirubin (UA) Negative      Occult Blood UA 1+ (*)     Nitrite, UA Negative      Leukocytes, UA Negative      Narrative:     Specimen Source->Urine   URINALYSIS MICROSCOPIC - Abnormal    RBC, UA 16 (*)     WBC, UA 3      Bacteria Occasional      Microscopic Comment SEE COMMENT      Narrative:     Specimen Source->Urine   HIV 1 / 2 ANTIBODY    HIV 1/2 Ag/Ab Non-reactive      Narrative:     Release to patient->Immediate   LIPASE    Lipase 11     HEPATITIS C RNA, QUANTITATIVE, PCR   ISTAT LACTATE    POC Lactate 1.35      Sample VENOUS      Site Other      Allens Test N/A            Imaging Results               CT Abdomen Pelvis With IV Contrast NO Oral Contrast (Final result)  Result time 03/02/25 16:37:39      Final result by Juan F Fairchild MD (03/02/25 16:37:39)                   Impression:      Left-sided obstructive uropathy with a 2 mm ureteral calculus 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    Hepatic steatosis.    This report was flagged in Epic as abnormal.      Electronically signed by: Juan F Fairchild  Date:    03/02/2025  Time:    16:37               Narrative:    EXAMINATION:  CT ABDOMEN PELVIS WITH IV CONTRAST    CLINICAL HISTORY:  LLQ abdominal pain;    TECHNIQUE:  Low dose axial images, sagittal and coronal reformations were obtained from the lung bases to the pubic symphysis following the IV administration of 100 mL of Omnipaque 350 .  Oral contrast was not administered.    COMPARISON:  06/15/2023    FINDINGS:  Abdomen:    - Lower thorax:Normal appearance of the base of the heart.    - Lung bases: No infiltrates and no nodules.    - Liver: There is hepatic steatosis.  The liver is normal in size.  No focal hepatic mass.    - Gallbladder: No calcified gallstones.    - Bile Ducts: No evidence of intra or extra hepatic biliary ductal dilation.    - Spleen: Negative.    - Kidneys: Again demonstrated is a ptotic malrotated left hemipelvic kidney.  Associated with this finding is hydroureteronephrosis to the level of a 2 mm distal left ureteral obstructing calculus on image 141 of series 2.  There is stranding of the fat surrounding the kidney with perinephric fluid.  There is hyperemia of the wall of the proximal left ureter.    The right kidney demonstrates a midpole cyst measuring 1.3 cm.  No right-sided hydronephrosis.    - Adrenals: Unremarkable.    - Pancreas: No mass or peripancreatic fat stranding.    - Retroperitoneum:  No significant adenopathy.    - Vascular: No abdominal aortic aneurysm.    - Abdominal wall:  Unremarkable.    Pelvis:    No calcified stones within the bladder.  No bladder wall thickening.    Bowel/Mesentery:    No evidence of bowel obstruction or inflammation.    Bones:  No acute osseous abnormality and no suspicious lytic or blastic lesion.                                       Medications   morphine injection 4 mg (4 mg Intravenous Given 3/2/25 1500)    sodium chloride 0.9% bolus 1,000 mL 1,000 mL (0 mLs Intravenous Stopped 3/2/25 1630)   iohexoL (OMNIPAQUE 350) injection 100 mL (100 mLs Intravenous Given 3/2/25 1619)   morphine injection 4 mg (4 mg Intravenous Given 3/2/25 1703)   tamsulosin 24 hr capsule 0.4 mg (0.4 mg Oral Given 3/2/25 1704)   ketorolac injection 15 mg (15 mg Intravenous Given 3/2/25 1704)   lactated ringers bolus 1,000 mL (0 mLs Intravenous Stopped 3/2/25 1825)     Medical Decision Making  45-year-old male with a PMHx of hearing impairment presents to the ED with left lower quadrant abdominal pain and flank pain x1 day. Nontoxic appearing. Hemodynamically stable. Afebrile. Exam as above. I will initiate workup and reassess.    Ddx:  Diverticulitis, bowel perforation, intra-abdominal abscess, mass, colitis, obstructive uropathy, UTI, pyelonephritis    Workup today is significant for 2 mm ureteral calculus associated with congenital the ptotic malrotated kidney.  Labs today without leukocytosis.  No DEISY.  His lactate is elevated though he is not febrile, tachycardic, or hypoxic.  No source of infection identified at this time.  No leukocytosis.  I do not suspect lactate is elevated 2/2 infectious cause at this time.  Lactate improved after fluids.  UA does not appear infectious.  Blood present though negative for nitrites or leukocytes concerning for infection.  Patient reassessed and feels better.  He has minimal pain at this time.  He is no longer vomiting.  His currently has a pain level of 2.  Referral placed with Urology for kidney stone follow up and for congenital ptotic malrotated kidney noted on imaging today.  Flomax, Zofran, ibuprofen, short course of oxycodone sent to pharmacy of choice for symptomatic control.  At this time he is stable for discharge. Strict ED precautions given to return immediately for new, worsening, or concerning symptoms    Amount and/or Complexity of Data Reviewed  Labs: ordered. Decision-making details  documented in ED Course.  Radiology: ordered. Decision-making details documented in ED Course.    Risk  Prescription drug management.              Attending Attestation:     Physician Attestation Statement for NP/PA:   I personally made/approved the management plan and take responsibility for the patient management.    Other NP/PA Attestation Additions:    History of Present Illness: 45-year-old man with comorbidities of congenital abnormal left kidney presents to the emergency department for evaluation of flank pain also noted to exhibit nausea and vomiting in ED.               ED Course as of 03/03/25 1418   Sun Mar 02, 2025   1526 POC Lactate: 3.4  Elevated, receiving IVF. No leukocytosis or fever. [HM]   1538 WBC: 8.10 [HM]   1652 Creatinine: 1.3 [HM]   1652 CT Abdomen Pelvis With IV Contrast NO Oral Contrast(!)  2 mm ureteral calculi associated with congenitally ptotic malrotated kidney noted on CT today [HM]   1806 Blood, UA(!): 1+ [HM]   1807 NITRITE UA: Negative [HM]   1807 Leukocyte Esterase, UA: Negative [HM]   1807 Urinalysis, Reflex to Urine Culture Urine, Clean Catch(!)  Urinalysis with 1+ blood though negative for nitrites or leukocytes concerning for infection.  Patient denies urinary symptoms at this time [HM]      ED Course User Index  [HM] Ruba Hill PA-C                           Clinical Impression:  Final diagnoses:  [R10.32] Left lower quadrant abdominal pain (Primary)  [N20.1] Ureteral stone  [Q63.9] Congenital anomaly of kidney  [N28.1] Cyst of right kidney          ED Disposition Condition    Discharge Stable          ED Prescriptions       Medication Sig Dispense Start Date End Date Auth. Provider    oxyCODONE (ROXICODONE) 5 MG immediate release tablet  (Status: Discontinued) Take 1 tablet (5 mg total) by mouth every 6 (six) hours as needed for Pain. 12 tablet 3/2/2025 3/2/2025 Ruba Hill PA-C    ibuprofen (ADVIL,MOTRIN) 800 MG tablet  (Status: Discontinued) Take 1 tablet  (800 mg total) by mouth every 6 (six) hours as needed for Pain. 20 tablet 3/2/2025 3/2/2025 Ruba Hill PA-C    ondansetron (ZOFRAN-ODT) 4 MG TbDL  (Status: Discontinued) Take 1 tablet (4 mg total) by mouth every 8 (eight) hours as needed. 16 tablet 3/2/2025 3/2/2025 Ruab Hill PA-C    ibuprofen (ADVIL,MOTRIN) 800 MG tablet Take 1 tablet (800 mg total) by mouth every 6 (six) hours as needed for Pain. 20 tablet 3/2/2025 -- Ruba Hill PA-C    ondansetron (ZOFRAN-ODT) 4 MG TbDL Take 1 tablet (4 mg total) by mouth every 8 (eight) hours as needed. 16 tablet 3/2/2025 -- Ruba Hill PA-C    oxyCODONE (ROXICODONE) 5 MG immediate release tablet Take 1 tablet (5 mg total) by mouth every 6 (six) hours as needed for Pain. 12 tablet 3/2/2025 -- Ruba Hill PA-C          Follow-up Information       Follow up With Specialties Details Why Contact Info Additional Information    [facility] - Emergency Dept Emergency Medicine  If symptoms worsen [address] [facility] [phone]     [facility] Ball - Urology [facility] Urology Schedule an appointment as soon as possible for a visit   [address] [facility] [phone] Main Building, 4th Floor Please park in [facility] and take [facility] elevator               Ruba Hill PA-C  03/03/25 1252         [1]   Social History  Tobacco Use    Smoking status: Never    Smokeless tobacco: Never   Substance Use Topics    Alcohol use: Yes    Drug use: No        Antonio Lynch MD  03/03/25 1270

## 2025-03-02 NOTE — ED NOTES
iris Chi 45 y.o. male presents to the ED w/ complaint of abdominal and flank pain, nv    Triage note:  Chief Complaint   Patient presents with    Abdominal Pain    Flank Pain    Nausea    Vomiting     Review of patient's allergies indicates:  No Known Allergies

## 2025-03-03 NOTE — DISCHARGE INSTRUCTIONS
You have an obstructive kidney stone of approximately 2 mm noted on the left side.  This most likely is causing your pain.      Flomax prescribed today to help you passed kidney stone.      You also received ibuprofen for pain control.  Take with meals prevent GI bleeds.  Take as 1st line for pain control of 6/10 or less.      You were also prescribed oxycodone for severe pain.   This medication has a addictive and sedating properties.  Caution while taking this medication.  Do not drive or drink alcohol while taking this medication.  This should be used for second-line for pain control of 7/10 or greater.      Zofran prescribed today for nausea and vomiting.  You have a congenital anomaly of your left kidney.      I have recommend that you follow up with Urology for this finding and kidney stones.  I placed a referral today.  Strain all of your urine    Strict ED precautions given to return immediately for new, worsening, or concerning symptoms including but limited to burning with urination, inability to urinate, intractable abdominal pain, intractable flank pain, inability to urinate, abdominal pain and fever, urinary symptoms and fever

## 2025-03-25 ENCOUNTER — OFFICE VISIT (OUTPATIENT)
Dept: UROLOGY | Facility: CLINIC | Age: 45
End: 2025-03-25
Payer: MEDICARE

## 2025-03-25 VITALS
SYSTOLIC BLOOD PRESSURE: 157 MMHG | WEIGHT: 207.88 LBS | DIASTOLIC BLOOD PRESSURE: 107 MMHG | HEART RATE: 60 BPM | BODY MASS INDEX: 25.31 KG/M2 | HEIGHT: 76 IN

## 2025-03-25 DIAGNOSIS — N20.1 URETERAL STONE: ICD-10-CM

## 2025-03-25 DIAGNOSIS — N22 CALCULUS OF URINARY TRACT IN DISEASES CLASSIFIED ELSEWHERE: Primary | ICD-10-CM

## 2025-03-25 DIAGNOSIS — I10 PRIMARY HYPERTENSION: ICD-10-CM

## 2025-03-25 PROCEDURE — 99204 OFFICE O/P NEW MOD 45 MIN: CPT | Mod: S$PBB,,, | Performed by: UROLOGY

## 2025-03-25 PROCEDURE — 99999 PR PBB SHADOW E&M-EST. PATIENT-LVL III: CPT | Mod: PBBFAC,,, | Performed by: UROLOGY

## 2025-03-25 PROCEDURE — 99213 OFFICE O/P EST LOW 20 MIN: CPT | Mod: PBBFAC | Performed by: UROLOGY

## 2025-03-25 NOTE — PROGRESS NOTES
Subjective:       Patient ID: Olive Pascal is a 45 y.o. male.    Chief Complaint:  Kidney stone      History of Present Illness  HPI  Patient is a 45 y.o. male who is new to our clinic and referred by the ED, Ruba Hill PA-C for evaluation of kidney stones.   History of Present Illness    Patient presents today for follow up of left ureteral stone found in ER three weeks ago. He reports persistent lower abdominal pain rated as 4/10 in severity that is constant in nature. He reports fevers but has not measured temperature. He denies nausea or vomiting. He has been straining urine consistently with no missed voids. He has anatomical variation with pelvic kidney and malrotation affecting ureteral positioning. This is his third occurrence of kidney stones. He has a history of elevated blood pressure with no current primary care physician. He checks blood pressure at home with occasional elevated readings.           CT scan of the abdomen and pelvis with contrast from 03/02/2025 was independently reviewed today and reveals left malrotated pelvic kidney with hydronephrosis down to the level of the distal ureter where there is a 2-3 mm stone.    Urinalysis from ED on 3/2/25 trace protein, 2+ ketones, 1+ blood, nitrite and leukocyte negative    Review of Systems  Review of Systems  All other systems reviewed and negative except pertinent positives noted in HPI.       Objective:     Physical Exam  Constitutional:       General: He is not in acute distress.     Appearance: He is well-developed.   HENT:      Head: Normocephalic and atraumatic.   Eyes:      General: No scleral icterus.  Neck:      Trachea: No tracheal deviation.   Pulmonary:      Effort: Pulmonary effort is normal. No respiratory distress.   Abdominal:      Tenderness: There is abdominal tenderness (Mild) in the right lower quadrant and left lower quadrant. There is right CVA tenderness and left CVA tenderness.   Neurological:      Mental Status: He is  alert and oriented to person, place, and time.   Psychiatric:         Behavior: Behavior normal.         Thought Content: Thought content normal.         Judgment: Judgment normal.         Lab Review  Lab Results   Component Value Date    COLORU Yellow 03/02/2025    SPECGRAV >=1.030 (A) 03/02/2025    PHUR 8.0 03/02/2025    NITRITE Negative 03/02/2025    KETONESU 2+ (A) 03/02/2025    UROBILINOGEN Normal 06/13/2023         Assessment:        1. Calculus of urinary tract in diseases classified elsewhere    2. Ureteral stone    3. Primary hypertension            Plan:     Calculus of urinary tract in diseases classified elsewhere  -     CT Renal Stone Study ABD Pelvis WO; Future; Expected date: 03/25/2025    Ureteral stone  -     Ambulatory referral/consult to Urology    Primary hypertension      Assessment & Plan    N20.1 Calculus of ureter  N26.2 Page kidney  I10 Essential (primary) hypertension  R10.30 Lower abdominal pain, unspecified  R50.9 Fever, unspecified  Z87.442 Personal history of urinary calculi    IMPRESSION:  - Reviewed history of recent ER visit for back and abdominal pain, with CT showing left ureteral stone.  - Considered possibility of stone passage given small size initially reported.  - Determined need for follow-up imaging to confirm stone status, given persistent symptoms after 3 weeks.  - Recommend CT over US due to atypical anatomy (pelvic kidney, malrotated, complex ureteral path).  - If stone still present, considering ureteroscopy as gold standard treatment for distal ureteral stones.  - Noted fluctuations in renal function, potentially related to stone presence.  - Observed elevated BP (157/107) during visit, considering history of intermittent HTN.  - Discussed shockwave lithotripsy as alternative treatment but not recommended in this case.    CALCULUS OF URETER:  - Patient to continue straining urine to potentially catch passed stone.  - Ordered CT Kidneys to evaluate current status of  ureteral stone.    ESSENTIAL HYPERTENSION:  - Recommend re-establishing care with primary care physician for BP management and renal function monitoring.         Plan pending repeat CT RSS    This note was generated with the assistance of ambient listening technology. Verbal consent was obtained by the patient and accompanying visitor(s) for the recording of patient appointment to facilitate this note. I attest to having reviewed and edited the generated note for accuracy, though some syntax or spelling errors may persist. Please contact the author of this note for any clarification.    The patient was seen and examined with the resident physician.  All questions were answered.  The plan was discussed with the patient and I concur with the resident physician's documentation.

## 2025-03-27 ENCOUNTER — RESULTS FOLLOW-UP (OUTPATIENT)
Dept: EMERGENCY MEDICINE | Facility: HOSPITAL | Age: 45
End: 2025-03-27

## 2025-03-27 ENCOUNTER — HOSPITAL ENCOUNTER (OUTPATIENT)
Dept: RADIOLOGY | Facility: HOSPITAL | Age: 45
Discharge: HOME | End: 2025-03-27
Attending: UROLOGY
Payer: MEDICARE

## 2025-03-27 DIAGNOSIS — R76.8 HEPATITIS C ANTIBODY TEST POSITIVE: Primary | ICD-10-CM

## 2025-03-27 DIAGNOSIS — N22 CALCULUS OF URINARY TRACT IN DISEASES CLASSIFIED ELSEWHERE: ICD-10-CM

## 2025-03-27 PROCEDURE — 74176 CT ABD & PELVIS W/O CONTRAST: CPT | Mod: TC

## 2025-03-28 ENCOUNTER — TELEPHONE (OUTPATIENT)
Dept: UROLOGY | Facility: CLINIC | Age: 45
End: 2025-03-28
Payer: MEDICARE

## 2025-03-28 ENCOUNTER — RESULTS FOLLOW-UP (OUTPATIENT)
Dept: UROLOGY | Facility: HOSPITAL | Age: 45
End: 2025-03-28

## 2025-03-28 NOTE — TELEPHONE ENCOUNTER
Spoke with patient's mother, Bozena.  Notified that CT was negative, patient has passed the kidney stone.  Can follow up as needed.  Understanding verbalized.